# Patient Record
Sex: FEMALE | Race: WHITE | NOT HISPANIC OR LATINO | Employment: FULL TIME | URBAN - METROPOLITAN AREA
[De-identification: names, ages, dates, MRNs, and addresses within clinical notes are randomized per-mention and may not be internally consistent; named-entity substitution may affect disease eponyms.]

---

## 2019-12-16 ENCOUNTER — OFFICE VISIT (OUTPATIENT)
Dept: PODIATRY | Facility: CLINIC | Age: 59
End: 2019-12-16
Payer: COMMERCIAL

## 2019-12-16 VITALS — RESPIRATION RATE: 17 BRPM | BODY MASS INDEX: 34.55 KG/M2 | WEIGHT: 176 LBS | HEIGHT: 60 IN

## 2019-12-16 DIAGNOSIS — B35.3 TINEA PEDIS OF BOTH FEET: ICD-10-CM

## 2019-12-16 DIAGNOSIS — M89.8X9 BONY EXOSTOSIS: ICD-10-CM

## 2019-12-16 DIAGNOSIS — M20.41 HAMMER TOE OF RIGHT FOOT: Primary | ICD-10-CM

## 2019-12-16 DIAGNOSIS — M79.671 RIGHT FOOT PAIN: ICD-10-CM

## 2019-12-16 PROCEDURE — 99203 OFFICE O/P NEW LOW 30 MIN: CPT | Performed by: PODIATRIST

## 2019-12-16 PROCEDURE — 73620 X-RAY EXAM OF FOOT: CPT | Performed by: PODIATRIST

## 2019-12-16 RX ORDER — KETOCONAZOLE 20 MG/G
CREAM TOPICAL DAILY
Qty: 60 G | Refills: 1 | Status: SHIPPED | OUTPATIENT
Start: 2019-12-16 | End: 2021-03-25

## 2019-12-16 NOTE — PROGRESS NOTES
Assessment/Plan:  Pain upon ambulation  Soft corn 4th right interdigital space  Hammertoe formation 4th and 5th right toes  Exostosis formation  Plantar tinea pedis    Plan  X-rays taken  Lesion debrided  Patient advised on options  She presented to the office with the idea of surgical intervention  She has tried periodic debridements in the past in this fails to alleviate her complaint of pain  She therefore presented for surgical opinion  She is requesting surgery understanding all the possible risks benefits alternatives and complications understand no guarantees have been given  In addition, we will add topical antifungal     Diagnoses and all orders for this visit:    Hammer toe of right foot    Bony exostosis    Right foot pain          Subjective:  Patient has longstanding complaint of pain in toes of the right foot  She is aware she has a corn between the toes  She has been seeing another podiatrist for quite some time  She gets periodic debridement of the corn  This fails to alleviate her complaint of pain  She presents today for options  She is inquiring about surgery  History reviewed  No pertinent past medical history  History reviewed  No pertinent surgical history  No Known Allergies    No current outpatient medications on file  There is no problem list on file for this patient  Patient ID: Ronnie Talley is a 64 y o  female  HPI    The following portions of the patient's history were reviewed and updated as appropriate:     family history is not on file  reports that she has never smoked  She has never used smokeless tobacco  Her alcohol and drug histories are not on file  Vitals:    12/16/19 1209   Resp: 17       Review of Systems      Objective:  Patient's shoes and socks removed     Foot Exam    General  General Appearance: appears stated age and healthy   Orientation: alert and oriented to person, place, and time   Affect: appropriate   Gait: antalgic Right Foot/Ankle     Inspection and Palpation  Ecchymosis: none  Tenderness: bony tenderness   Swelling: dorsum and metatarsals   Arch: pes planus  Hammertoes: fourth toe and fifth toe  Hallux valgus: no  Hallux limitus: yes  Skin Exam: callus, dry skin and tinea; Neurovascular  Dorsalis pedis: 3+  Posterior tibial: 3+  Saphenous nerve sensation: normal  Tibial nerve sensation: normal  Superficial peroneal nerve sensation: normal  Deep peroneal nerve sensation: normal  Sural nerve sensation: normal  Achilles reflex: 2+  Babinski reflex: 2+      Left Foot/Ankle      Inspection and Palpation  Ecchymosis: none  Swelling: dorsum and metatarsals   Arch: pes planus  Hammertoes: fifth toe  Hallux valgus: no  Hallux limitus: yes  Skin Exam: callus, dry skin and tinea; Neurovascular  Dorsalis pedis: 3+  Posterior tibial: 3+  Saphenous nerve sensation: normal  Tibial nerve sensation: normal  Superficial peroneal nerve sensation: normal  Deep peroneal nerve sensation: normal  Sural nerve sensation: normal  Achilles reflex: 2+  Babinski reflex: 2+        Physical Exam   Constitutional: She is oriented to person, place, and time  She appears well-developed and well-nourished  Cardiovascular: Normal rate and regular rhythm  Pulses:       Dorsalis pedis pulses are 3+ on the right side, and 3+ on the left side  Posterior tibial pulses are 3+ on the right side, and 3+ on the left side  Musculoskeletal: She exhibits edema, tenderness and deformity  Right foot: There is bony tenderness  Hammertoe formation noted right foot  Adducto- varus 5th right toe noted  X-ray  Forefoot deviation noted  Feet:   Right Foot:   Skin Integrity: Positive for callus and dry skin  Left Foot:   Skin Integrity: Positive for callus and dry skin  Neurological: She is alert and oriented to person, place, and time     Reflex Scores:       Achilles reflexes are 2+ on the right side and 2+ on the left side   Skin: Skin is warm  Capillary refill takes less than 2 seconds  Soft corn in 4th right interdigital space   Psychiatric: She has a normal mood and affect  Her behavior is normal  Judgment and thought content normal    Vitals reviewed          Procedures

## 2019-12-17 PROBLEM — M20.41 ACQUIRED HAMMER TOE OF RIGHT FOOT: Status: ACTIVE | Noted: 2019-12-17

## 2019-12-17 PROBLEM — M89.8X9 EXOSTOSIS: Status: ACTIVE | Noted: 2019-12-17

## 2020-02-10 ENCOUNTER — APPOINTMENT (OUTPATIENT)
Dept: PREADMISSION TESTING | Facility: HOSPITAL | Age: 60
End: 2020-02-10
Payer: COMMERCIAL

## 2020-02-10 ENCOUNTER — APPOINTMENT (OUTPATIENT)
Dept: LAB | Facility: HOSPITAL | Age: 60
End: 2020-02-10
Attending: PODIATRIST
Payer: COMMERCIAL

## 2020-02-10 DIAGNOSIS — M89.8X9 EXOSTOSIS: ICD-10-CM

## 2020-02-10 DIAGNOSIS — M20.41 ACQUIRED HAMMER TOE OF RIGHT FOOT: ICD-10-CM

## 2020-02-10 LAB
ANION GAP SERPL CALCULATED.3IONS-SCNC: 9 MMOL/L (ref 4–13)
BASOPHILS # BLD AUTO: 0.02 THOUSANDS/ΜL (ref 0–0.1)
BASOPHILS NFR BLD AUTO: 0 % (ref 0–1)
BUN SERPL-MCNC: 18 MG/DL (ref 5–25)
CALCIUM SERPL-MCNC: 8.8 MG/DL (ref 8.3–10.1)
CHLORIDE SERPL-SCNC: 105 MMOL/L (ref 100–108)
CO2 SERPL-SCNC: 28 MMOL/L (ref 21–32)
CREAT SERPL-MCNC: 0.71 MG/DL (ref 0.6–1.3)
EOSINOPHIL # BLD AUTO: 0.02 THOUSAND/ΜL (ref 0–0.61)
EOSINOPHIL NFR BLD AUTO: 0 % (ref 0–6)
ERYTHROCYTE [DISTWIDTH] IN BLOOD BY AUTOMATED COUNT: 14.5 % (ref 11.6–15.1)
GFR SERPL CREATININE-BSD FRML MDRD: 94 ML/MIN/1.73SQ M
GLUCOSE P FAST SERPL-MCNC: 104 MG/DL (ref 65–99)
HCT VFR BLD AUTO: 37.5 % (ref 34.8–46.1)
HGB BLD-MCNC: 11.7 G/DL (ref 11.5–15.4)
IMM GRANULOCYTES # BLD AUTO: 0.01 THOUSAND/UL (ref 0–0.2)
IMM GRANULOCYTES NFR BLD AUTO: 0 % (ref 0–2)
LYMPHOCYTES # BLD AUTO: 2.51 THOUSANDS/ΜL (ref 0.6–4.47)
LYMPHOCYTES NFR BLD AUTO: 52 % (ref 14–44)
MCH RBC QN AUTO: 26.3 PG (ref 26.8–34.3)
MCHC RBC AUTO-ENTMCNC: 31.2 G/DL (ref 31.4–37.4)
MCV RBC AUTO: 84 FL (ref 82–98)
MONOCYTES # BLD AUTO: 0.36 THOUSAND/ΜL (ref 0.17–1.22)
MONOCYTES NFR BLD AUTO: 7 % (ref 4–12)
NEUTROPHILS # BLD AUTO: 1.98 THOUSANDS/ΜL (ref 1.85–7.62)
NEUTS SEG NFR BLD AUTO: 41 % (ref 43–75)
NRBC BLD AUTO-RTO: 0 /100 WBCS
PLATELET # BLD AUTO: 330 THOUSANDS/UL (ref 149–390)
PMV BLD AUTO: 9.7 FL (ref 8.9–12.7)
POTASSIUM SERPL-SCNC: 4.2 MMOL/L (ref 3.5–5.3)
RBC # BLD AUTO: 4.45 MILLION/UL (ref 3.81–5.12)
SODIUM SERPL-SCNC: 142 MMOL/L (ref 136–145)
WBC # BLD AUTO: 4.9 THOUSAND/UL (ref 4.31–10.16)

## 2020-02-10 PROCEDURE — 85025 COMPLETE CBC W/AUTO DIFF WBC: CPT

## 2020-02-10 PROCEDURE — 93005 ELECTROCARDIOGRAM TRACING: CPT

## 2020-02-10 PROCEDURE — 80048 BASIC METABOLIC PNL TOTAL CA: CPT

## 2020-02-10 PROCEDURE — 36415 COLL VENOUS BLD VENIPUNCTURE: CPT

## 2020-02-11 ENCOUNTER — OFFICE VISIT (OUTPATIENT)
Dept: PODIATRY | Facility: CLINIC | Age: 60
End: 2020-02-11
Payer: COMMERCIAL

## 2020-02-11 VITALS
DIASTOLIC BLOOD PRESSURE: 78 MMHG | RESPIRATION RATE: 16 BRPM | HEART RATE: 60 BPM | HEIGHT: 60 IN | SYSTOLIC BLOOD PRESSURE: 127 MMHG | BODY MASS INDEX: 34.55 KG/M2 | WEIGHT: 176 LBS

## 2020-02-11 DIAGNOSIS — M79.671 RIGHT FOOT PAIN: ICD-10-CM

## 2020-02-11 DIAGNOSIS — M20.41 HAMMER TOE OF RIGHT FOOT: Primary | ICD-10-CM

## 2020-02-11 DIAGNOSIS — B35.3 TINEA PEDIS OF BOTH FEET: ICD-10-CM

## 2020-02-11 DIAGNOSIS — M89.8X9 BONY EXOSTOSIS: ICD-10-CM

## 2020-02-11 PROCEDURE — 99213 OFFICE O/P EST LOW 20 MIN: CPT | Performed by: PODIATRIST

## 2020-02-11 NOTE — PROGRESS NOTES
Assessment/Plan:  Soft corn 4th right interdigital space  Hammertoe formation with secondary exostosis  Tinea pedis  Plan  Patient would like to cancel surgery  She will be traveling [place]  Today lesions debrided  Patient remain on topical antifungal   She still may need surgery in the future  There are no diagnoses linked to this encounter  Subjective:  Patient has pain in the toes of the right foot  She has not been using cream as directed  She would like to cancel surgical she is traveling  No Known Allergies      Current Outpatient Medications:     ibuprofen (MOTRIN) 200 mg tablet, Take by mouth every 6 (six) hours as needed for mild pain, Disp: , Rfl:     ketoconazole (NIZORAL) 2 % cream, Apply topically daily, Disp: 60 g, Rfl: 1    Multiple Vitamin (MULTIVITAMIN) tablet, Take 1 tablet by mouth daily, Disp: , Rfl:     Patient Active Problem List   Diagnosis    Acquired hammer toe of right foot    Exostosis          Patient ID: Addison Riojas is a 64 y o  female  HPI    The following portions of the patient's history were reviewed and updated as appropriate:     family history is not on file  reports that she has never smoked  She has never used smokeless tobacco  She reports that she does not drink alcohol or use drugs  Vitals:    02/11/20 0941   BP: 127/78   Pulse: 60   Resp: 16       Review of Systems      Objective:  Patient's shoes and socks removed     Foot ExamPhysical Exam       Foot Exam     General  General Appearance: appears stated age and healthy   Orientation: alert and oriented to person, place, and time   Affect: appropriate   Gait: antalgic         Right Foot/Ankle      Inspection and Palpation  Ecchymosis: none  Tenderness: bony tenderness   Swelling: dorsum and metatarsals   Arch: pes planus  Hammertoes: fourth toe and fifth toe  Hallux valgus: no  Hallux limitus: yes  Skin Exam: callus, dry skin and tinea;      Neurovascular  Dorsalis pedis: 3+  Posterior tibial: 3+  Saphenous nerve sensation: normal  Tibial nerve sensation: normal  Superficial peroneal nerve sensation: normal  Deep peroneal nerve sensation: normal  Sural nerve sensation: normal  Achilles reflex: 2+  Babinski reflex: 2+        Left Foot/Ankle       Inspection and Palpation  Ecchymosis: none  Swelling: dorsum and metatarsals   Arch: pes planus  Hammertoes: fifth toe  Hallux valgus: no  Hallux limitus: yes  Skin Exam: callus, dry skin and tinea;      Neurovascular  Dorsalis pedis: 3+  Posterior tibial: 3+  Saphenous nerve sensation: normal  Tibial nerve sensation: normal  Superficial peroneal nerve sensation: normal  Deep peroneal nerve sensation: normal  Sural nerve sensation: normal  Achilles reflex: 2+  Babinski reflex: 2+           Physical Exam   Constitutional: She is oriented to person, place, and time  She appears well-developed and well-nourished  Cardiovascular: Normal rate and regular rhythm  Pulses:       Dorsalis pedis pulses are 3+ on the right side, and 3+ on the left side  Posterior tibial pulses are 3+ on the right side, and 3+ on the left side  Musculoskeletal: She exhibits edema, tenderness and deformity  Right foot: There is bony tenderness  Hammertoe formation noted right foot  Adducto- varus 5th right toe noted  X-ray  Forefoot deviation noted  Feet:   Right Foot:   Skin Integrity: Positive for callus and dry skin  Left Foot:   Skin Integrity: Positive for callus and dry skin  Neurological: She is alert and oriented to person, place, and time  Reflex Scores:       Achilles reflexes are 2+ on the right side and 2+ on the left side  Skin: Skin is warm  Capillary refill takes less than 2 seconds  Soft corn in 4th right interdigital space   Psychiatric: She has a normal mood and affect   Her behavior is normal  Judgment and thought content normal

## 2020-02-12 LAB
ATRIAL RATE: 58 BPM
P AXIS: 71 DEGREES
PR INTERVAL: 162 MS
QRS AXIS: 7 DEGREES
QRSD INTERVAL: 84 MS
QT INTERVAL: 444 MS
QTC INTERVAL: 435 MS
T WAVE AXIS: 52 DEGREES
VENTRICULAR RATE: 58 BPM

## 2020-02-12 PROCEDURE — 93010 ELECTROCARDIOGRAM REPORT: CPT | Performed by: INTERNAL MEDICINE

## 2021-03-25 ENCOUNTER — APPOINTMENT (EMERGENCY)
Dept: RADIOLOGY | Facility: HOSPITAL | Age: 61
End: 2021-03-25
Payer: COMMERCIAL

## 2021-03-25 ENCOUNTER — HOSPITAL ENCOUNTER (EMERGENCY)
Facility: HOSPITAL | Age: 61
Discharge: HOME | End: 2021-03-25
Attending: EMERGENCY MEDICINE
Payer: COMMERCIAL

## 2021-03-25 VITALS
TEMPERATURE: 97.2 F | OXYGEN SATURATION: 100 % | DIASTOLIC BLOOD PRESSURE: 81 MMHG | HEART RATE: 70 BPM | RESPIRATION RATE: 18 BRPM | SYSTOLIC BLOOD PRESSURE: 135 MMHG

## 2021-03-25 DIAGNOSIS — S80.01XA CONTUSION OF RIGHT KNEE, INITIAL ENCOUNTER: ICD-10-CM

## 2021-03-25 DIAGNOSIS — S09.90XA INJURY OF HEAD, INITIAL ENCOUNTER: Primary | ICD-10-CM

## 2021-03-25 DIAGNOSIS — S06.0X9A CONCUSSION: ICD-10-CM

## 2021-03-25 PROCEDURE — 73564 X-RAY EXAM KNEE 4 OR MORE: CPT

## 2021-03-25 PROCEDURE — 70450 CT HEAD/BRAIN W/O DYE: CPT

## 2021-03-25 PROCEDURE — 99284 EMERGENCY DEPT VISIT MOD MDM: CPT

## 2021-03-25 PROCEDURE — 99284 EMERGENCY DEPT VISIT MOD MDM: CPT | Performed by: EMERGENCY MEDICINE

## 2021-03-25 NOTE — ED PROVIDER NOTES
History  Chief Complaint   Patient presents with    Headache     Pt tripped over dog on SUNDAY, worked sicteen hour shift last night, now c/o headache, dizziness, neck pain and right knee pain  Patient presents for evaluation of a head injury  Patient states on Sunday she was walking her dogs when 1 of them pulled her and she fell forward  Patient sustained injury to her forehead and has swelling  Not have much pain at that time but later on developed a headache which she been treating with over-the-counter medications  She also had a sore right knee as well and right-sided neck pain  States last night she had a workup longer shift as 3rd relief called out sick  Headache fell worse or lightheadedness  Denies any nausea vomiting  No blood thinners and no loss of consciousness  Patient had not been evaluated previously for injuries  History provided by:  Patient   used: No    Headache  Associated symptoms: dizziness and neck pain    Associated symptoms: no abdominal pain, no back pain, no congestion, no cough, no fever, no nausea, no numbness, no photophobia, no vomiting and no weakness        Prior to Admission Medications   Prescriptions Last Dose Informant Patient Reported? Taking?   ibuprofen (MOTRIN) 200 mg tablet   Yes Yes   Sig: Take by mouth every 6 (six) hours as needed for mild pain      Facility-Administered Medications: None       Past Medical History:   Diagnosis Date    Colon polyp     Obesity        Past Surgical History:   Procedure Laterality Date    COLONOSCOPY      HYSTERECTOMY         History reviewed  No pertinent family history  I have reviewed and agree with the history as documented      E-Cigarette/Vaping     E-Cigarette/Vaping Substances     Social History     Tobacco Use    Smoking status: Never Smoker    Smokeless tobacco: Never Used   Substance Use Topics    Alcohol use: Never     Frequency: Never    Drug use: Never       Review of Systems Constitutional: Negative for chills and fever  HENT: Negative for congestion and trouble swallowing  Eyes: Negative for photophobia and visual disturbance  Respiratory: Negative for cough and shortness of breath  Cardiovascular: Negative for chest pain  Gastrointestinal: Negative for abdominal pain, nausea and vomiting  Genitourinary: Negative for difficulty urinating and dysuria  Musculoskeletal: Positive for arthralgias and neck pain  Negative for back pain  Skin: Negative for wound  Neurological: Positive for dizziness, light-headedness and headaches  Negative for weakness and numbness  All other systems reviewed and are negative  Physical Exam  Physical Exam  Vitals signs and nursing note reviewed  Constitutional:       General: She is not in acute distress  Appearance: Normal appearance  HENT:      Head:        Right Ear: External ear normal       Left Ear: External ear normal       Nose: Nose normal       Mouth/Throat:      Mouth: Mucous membranes are moist       Pharynx: Oropharynx is clear  Eyes:      General: No scleral icterus  Extraocular Movements: Extraocular movements intact  Conjunctiva/sclera: Conjunctivae normal       Pupils: Pupils are equal, round, and reactive to light  Neck:      Musculoskeletal: Normal range of motion and neck supple  Muscular tenderness present  Comments: Right lateral neck tenderness  No midline tenderness  Cardiovascular:      Rate and Rhythm: Normal rate and regular rhythm  Pulses: Normal pulses  Pulmonary:      Effort: Pulmonary effort is normal  No respiratory distress  Breath sounds: Normal breath sounds  No wheezing, rhonchi or rales  Abdominal:      General: Abdomen is flat  Bowel sounds are normal  There is no distension  Tenderness: There is no abdominal tenderness  There is no guarding or rebound  Musculoskeletal: Normal range of motion  General: Tenderness present  No deformity  Legs:    Skin:     Capillary Refill: Capillary refill takes less than 2 seconds  Findings: No rash  Neurological:      General: No focal deficit present  Mental Status: She is alert and oriented to person, place, and time  Cranial Nerves: No cranial nerve deficit  Sensory: No sensory deficit  Motor: No weakness  Coordination: Coordination normal       Gait: Gait normal          Vital Signs  ED Triage Vitals [03/25/21 0828]   Temperature Pulse Respirations Blood Pressure SpO2   (!) 97 2 °F (36 2 °C) 82 16 (!) 174/89 100 %      Temp Source Heart Rate Source Patient Position - Orthostatic VS BP Location FiO2 (%)   Tympanic Monitor Sitting Right arm --      Pain Score       6           Vitals:    03/25/21 0828 03/25/21 0952   BP: (!) 174/89 135/81   Pulse: 82 70   Patient Position - Orthostatic VS: Sitting Lying         Visual Acuity      ED Medications  Medications - No data to display    Diagnostic Studies  Results Reviewed     None                 XR knee 4+ views Right injury   Final Result by Florence Lang DO (03/25 1034)      No acute osseous abnormality  Degenerative changes as described  Workstation performed: [id number]         CT head without contrast   Final Result by José Manuel Cornell MD (03/25 3310)         1  No acute intracranial abnormality noted  2   2 3 x 1 9 x 2 1 cm osseous or dural based excrescence  in the left frontal region consistent with either calcified meningioma or osteochondroma/exostosis  Workstation performed: [id number]                    Procedures  Procedures         ED Course                                           MDM  Number of Diagnoses or Management Options  Concussion:   Contusion of right knee, initial encounter:   Injury of head, initial encounter:   Diagnosis management comments: Pulse ox 100% room air indicating adequate oxygenation    Xray R knee:  No fracture dislocation as read by me Patient likely experiencing post concussive symptoms that were made worse by her extended shift last night  Will obtain CT scan of the head of rule out any other acute pathology since symptoms have been worsening there is no initial evaluation  Results of CT scan including incidental finding of possible meningioma in the frontal lobe discussed with patient as well as outpatient follow-up  Patient likely experiencing symptoms of a concussion after head injury on Sunday  Amount and/or Complexity of Data Reviewed  Tests in the radiology section of CPT®: ordered and reviewed  Decide to obtain previous medical records or to obtain history from someone other than the patient: yes  Review and summarize past medical records: yes  Independent visualization of images, tracings, or specimens: yes    Patient Progress  Patient progress: stable      Disposition  Final diagnoses:   Injury of head, initial encounter   Concussion   Contusion of right knee, initial encounter     Time reflects when diagnosis was documented in both MDM as applicable and the Disposition within this note     Time User Action Codes Description Comment    3/25/2021  9:47 AM Naseem Palomares E Add [R51 9] Headache     3/25/2021  9:47 AM Paradise Clarke Remove [R51 9] Headache     3/25/2021  9:48 AM Paradise Clarke Add [S09 90XA] Injury of head, initial encounter     3/25/2021  9:48 AM Paradise Clarke Add [S06 0X9A] Concussion     3/25/2021  9:48 AM Nela Lyle Mock Add [S80 01XA] Contusion of right knee, initial encounter       ED Disposition     ED Disposition Condition Date/Time Comment    Discharge Stable Thu Mar 25, 2021  9:47 AM Earline Huerta discharge to home/self care              Follow-up Information     Follow up With Specialties Details Why Contact Info    [facility]  In 1 week  [phone]            Discharge Medication List as of 3/25/2021  9:48 AM      CONTINUE these medications which have NOT CHANGED    Details   ibuprofen (MOTRIN) 200 mg tablet Take by mouth every 6 (six) hours as needed for mild pain, Historical Med           No discharge procedures on file      PDMP Review     None          ED Provider  Electronically Signed by           Argentina Mckenzie DO  03/25/21 5639

## 2021-04-15 ENCOUNTER — OFFICE VISIT (OUTPATIENT)
Dept: PODIATRY | Facility: CLINIC | Age: 61
End: 2021-04-15
Payer: COMMERCIAL

## 2021-04-15 VITALS
DIASTOLIC BLOOD PRESSURE: 81 MMHG | HEIGHT: 60 IN | SYSTOLIC BLOOD PRESSURE: 135 MMHG | HEART RATE: 70 BPM | WEIGHT: 175 LBS | RESPIRATION RATE: 16 BRPM | BODY MASS INDEX: 34.36 KG/M2

## 2021-04-15 DIAGNOSIS — M79.671 RIGHT FOOT PAIN: ICD-10-CM

## 2021-04-15 DIAGNOSIS — B35.3 TINEA PEDIS OF BOTH FEET: ICD-10-CM

## 2021-04-15 DIAGNOSIS — M20.41 HAMMER TOE OF RIGHT FOOT: Primary | ICD-10-CM

## 2021-04-15 DIAGNOSIS — B35.1 ONYCHOMYCOSIS: ICD-10-CM

## 2021-04-15 PROCEDURE — 99213 OFFICE O/P EST LOW 20 MIN: CPT | Performed by: PODIATRIST

## 2021-04-15 RX ORDER — TERBINAFINE HYDROCHLORIDE 250 MG/1
250 TABLET ORAL DAILY
Qty: 30 TABLET | Refills: 0 | Status: SHIPPED | OUTPATIENT
Start: 2021-04-15 | End: 2021-05-15

## 2021-04-15 NOTE — PROGRESS NOTES
Assessment/Plan: pain upon ambulation  Hammertoe formation with secondary soft corn 4th space right foot  Mycosis of nail and skin  Plan  All abnormal skin debrided  Topical and oral antifungals ordered  Patient would like to reschedule hammertoe surgery  We will do this accordingly  Diagnoses and all orders for this visit:    Hammer toe of right foot    Right foot pain    Tinea pedis of both feet  -     ciclopirox (LOPROX) 0 77 % cream; Apply topically 2 (two) times a day for 20 days  -     terbinafine (LamISIL) 250 mg tablet; Take 1 tablet (250 mg total) by mouth daily    Onychomycosis  -     ciclopirox (LOPROX) 0 77 % cream; Apply topically 2 (two) times a day for 20 days  -     terbinafine (LamISIL) 250 mg tablet; Take 1 tablet (250 mg total) by mouth daily          Subjective:   Patient has continued ongoing pain in the feet  She has no history of trauma  She has painful corns  She would like to have surgery to fix the problem  No Known Allergies      Current Outpatient Medications:     ciclopirox (LOPROX) 0 77 % cream, Apply topically 2 (two) times a day for 20 days, Disp: 45 g, Rfl: 2    ibuprofen (MOTRIN) 200 mg tablet, Take by mouth every 6 (six) hours as needed for mild pain, Disp: , Rfl:     terbinafine (LamISIL) 250 mg tablet, Take 1 tablet (250 mg total) by mouth daily, Disp: 30 tablet, Rfl: 0    Patient Active Problem List   Diagnosis    Acquired hammer toe of right foot    Exostosis          Patient ID: John Chirinos is a 63 y o  female  HPI    The following portions of the patient's history were reviewed and updated as appropriate:     family history is not on file  reports that she has never smoked  She has never used smokeless tobacco  She reports that she does not drink alcohol or use drugs  Vitals:    04/15/21 1038   BP: 135/81   Pulse: 70   Resp: 16       Review of Systems      Objective:  Patient's shoes and socks removed     Foot ExamPhysical Exam  Foot Exam     General  General Appearance: appears stated age and healthy   Orientation: alert and oriented to person, place, and time   Affect: appropriate   Gait: antalgic         Right Foot/Ankle      Inspection and Palpation  Ecchymosis: none  Tenderness: bony tenderness   Swelling: dorsum and metatarsals   Arch: pes planus  Hammertoes: fourth toe and fifth toe  Hallux valgus: no  Hallux limitus: yes  Skin Exam: callus, dry skin and tinea;      Neurovascular  Dorsalis pedis: 3+  Posterior tibial: 3+  Saphenous nerve sensation: normal  Tibial nerve sensation: normal  Superficial peroneal nerve sensation: normal  Deep peroneal nerve sensation: normal  Sural nerve sensation: normal  Achilles reflex: 2+  Babinski reflex: 2+        Left Foot/Ankle       Inspection and Palpation  Ecchymosis: none  Swelling: dorsum and metatarsals   Arch: pes planus  Hammertoes: fifth toe  Hallux valgus: no  Hallux limitus: yes  Skin Exam: callus, dry skin and tinea;      Neurovascular  Dorsalis pedis: 3+  Posterior tibial: 3+  Saphenous nerve sensation: normal  Tibial nerve sensation: normal  Superficial peroneal nerve sensation: normal  Deep peroneal nerve sensation: normal  Sural nerve sensation: normal  Achilles reflex: 2+  Babinski reflex: 2+           Physical Exam   Constitutional: She is oriented to person, place, and time  She appears well-developed and well-nourished  Cardiovascular: Normal rate and regular rhythm  Pulses:       Dorsalis pedis pulses are 3+ on the right side, and 3+ on the left side         Posterior tibial pulses are 3+ on the right side, and 3+ on the left side  Musculoskeletal: She exhibits edema, tenderness and deformity       Right foot: There is bony tenderness    Hammertoe formation noted right foot   Adducto- varus 5th right toe noted  X-ray   Forefoot deviation noted          Feet:   Right Foot:   Skin Integrity: Positive for callus and dry skin     Left Foot:   Skin Integrity: Positive for callus and dry skin  Neurological: She is alert and oriented to person, place, and time  Reflex Scores:       Achilles reflexes are 2+ on the right side and 2+ on the left side  Skin: Skin is warm  Capillary refill takes less than 2 seconds    Soft corn in 4th right interdigital space   Psychiatric: She has a normal mood and affect   Her behavior is normal  Judgment and thought content normal

## 2021-06-16 RX ORDER — MELATONIN
1000 DAILY
COMMUNITY

## 2021-06-16 RX ORDER — ECHINACEA 400 MG
CAPSULE ORAL
COMMUNITY

## 2021-06-16 RX ORDER — MULTIVITAMIN
1 TABLET ORAL DAILY
COMMUNITY

## 2021-06-16 RX ORDER — CHLORAL HYDRATE 500 MG
1000 CAPSULE ORAL DAILY
COMMUNITY

## 2021-06-16 NOTE — PRE-PROCEDURE INSTRUCTIONS
My Surgical Experience    The following information was developed to assist you to prepare for your operation  What do I need to do before coming to the hospital?   Arrange for a responsible person to drive you to and from the hospital    Arrange care for your children at home  Children are not allowed in the recovery areas of the hospital   Plan to wear clothing that is easy to put on and take off  If you are having shoulder surgery, wear a shirt that buttons or zippers in the front  Bathing  o Shower the evening before and the morning of your surgery with an antibacterial soap  Please refer to the Pre Op Showering Instructions for Surgery Patients Sheet   o Remove nail polish and all body piercing jewelry  o Do not shave any body part for at least 24 hours before surgery-this includes face, arms, legs and upper body  Food  o Nothing to eat or drink after midnight the night before your surgery  This includes candy and chewing gum  o Exception: If your surgery is after 12:00pm (noon), you may have clear liquids such as 7-Up®, ginger ale, apple or cranberry juice, Jell-O®, water, or clear broth until 8:00 am  o Do not drink milk or juice with pulp on the morning before surgery  o Do not drink alcohol 24 hours before surgery  Medicine  o Follow instructions you received from your surgeon about which medicines you may take on the day of surgery  o If instructed to take medicine on the morning of surgery, take pills with just a small sip of water  Call your prescribing doctor for specific infroamtion on what to do if you take insulin    What should I bring to the hospital?    Bring:  Kana Nelson or a walker, if you have them, for foot or knee surgery   A list of the daily medicines, vitamins, minerals, herbals and nutritional supplements you take   Include the dosages of medicines and the time you take them each day   Glasses, dentures or hearing aids   Minimal clothing; you will be wearing hospital sleepwear   Photo ID; required to verify your identity   If you have a Living Will or Power of , bring a copy of the documents   If you have an ostomy, bring an extra pouch and any supplies you use    Do not bring   Medicines or inhalers   Money, valuables or jewelry    What other information should I know about the day of surgery?  Notify your surgeons if you develop a cold, sore throat, cough, fever, rash or any other illness   Report to the Ambulatory Surgical/Same Day Surgery Unit   You will be instructed to stop at Registration only if you have not been pre-registered   Inform your  fi they do not stay that they will be asked by the staff to leave a phone number where they can be reached   Be available to be reached before surgery  In the event the operating room schedule changes, you may be asked to come in earlier or later than expected    *It is important to tell your doctor and others involved in your health care if you are taking or have been taking any non-prescription drugs, vitamins, minerals, herbals or other nutritional supplements  Any of these may interact with some food or medicines and cause a reaction      Pre-Surgery Instructions:   Medication Instructions    cholecalciferol (VITAMIN D3) 1,000 units tablet Instructed patient per Anesthesia Guidelines   Flaxseed, Linseed, (Flaxseed Oil) 1000 MG CAPS Instructed patient per Anesthesia Guidelines   ibuprofen (MOTRIN) 200 mg tablet Instructed patient per Anesthesia Guidelines   Multiple Vitamin (multivitamin) tablet Instructed patient per Anesthesia Guidelines   Omega-3 Fatty Acids (fish oil) 1,000 mg Instructed patient per Anesthesia Guidelines

## 2021-06-21 ENCOUNTER — CLINICAL SUPPORT (OUTPATIENT)
Dept: URGENT CARE | Facility: CLINIC | Age: 61
End: 2021-06-21

## 2021-06-21 DIAGNOSIS — M20.41 ACQUIRED HAMMER TOE OF RIGHT FOOT: Primary | ICD-10-CM

## 2021-06-21 PROCEDURE — U0003 INFECTIOUS AGENT DETECTION BY NUCLEIC ACID (DNA OR RNA); SEVERE ACUTE RESPIRATORY SYNDROME CORONAVIRUS 2 (SARS-COV-2) (CORONAVIRUS DISEASE [COVID-19]), AMPLIFIED PROBE TECHNIQUE, MAKING USE OF HIGH THROUGHPUT TECHNOLOGIES AS DESCRIBED BY CMS-2020-01-R: HCPCS | Performed by: PODIATRIST

## 2021-06-21 PROCEDURE — U0005 INFEC AGEN DETEC AMPLI PROBE: HCPCS | Performed by: PODIATRIST

## 2021-06-24 ENCOUNTER — ANESTHESIA EVENT (OUTPATIENT)
Dept: PERIOP | Facility: HOSPITAL | Age: 61
End: 2021-06-24
Payer: COMMERCIAL

## 2021-06-25 ENCOUNTER — ANESTHESIA (OUTPATIENT)
Dept: PERIOP | Facility: HOSPITAL | Age: 61
End: 2021-06-25
Payer: COMMERCIAL

## 2021-06-25 ENCOUNTER — HOSPITAL ENCOUNTER (OUTPATIENT)
Facility: HOSPITAL | Age: 61
Setting detail: OUTPATIENT SURGERY
Discharge: HOME | End: 2021-06-25
Attending: PODIATRIST | Admitting: PODIATRIST
Payer: COMMERCIAL

## 2021-06-25 VITALS
HEIGHT: 60 IN | BODY MASS INDEX: 36.32 KG/M2 | TEMPERATURE: 97 F | WEIGHT: 185 LBS | SYSTOLIC BLOOD PRESSURE: 129 MMHG | OXYGEN SATURATION: 100 % | DIASTOLIC BLOOD PRESSURE: 72 MMHG | RESPIRATION RATE: 16 BRPM | HEART RATE: 77 BPM

## 2021-06-25 DIAGNOSIS — M20.41 HAMMER TOE OF RIGHT FOOT: Primary | ICD-10-CM

## 2021-06-25 DIAGNOSIS — M20.41 ACQUIRED HAMMER TOE OF RIGHT FOOT: ICD-10-CM

## 2021-06-25 DIAGNOSIS — M79.671 RIGHT FOOT PAIN: ICD-10-CM

## 2021-06-25 DIAGNOSIS — M89.8X9 EXOSTOSIS: Primary | ICD-10-CM

## 2021-06-25 PROCEDURE — 28285 REPAIR OF HAMMERTOE: CPT | Performed by: PODIATRIST

## 2021-06-25 PROCEDURE — 28124 PARTIAL REMOVAL OF TOE: CPT | Performed by: PODIATRIST

## 2021-06-25 RX ORDER — ACETAMINOPHEN 325 MG/1
650 TABLET ORAL EVERY 6 HOURS PRN
COMMUNITY

## 2021-06-25 RX ORDER — EPHEDRINE SULFATE 50 MG/ML
INJECTION INTRAVENOUS AS NEEDED
Status: DISCONTINUED | OUTPATIENT
Start: 2021-06-25 | End: 2021-06-25

## 2021-06-25 RX ORDER — MIDAZOLAM HYDROCHLORIDE 2 MG/2ML
INJECTION, SOLUTION INTRAMUSCULAR; INTRAVENOUS AS NEEDED
Status: DISCONTINUED | OUTPATIENT
Start: 2021-06-25 | End: 2021-06-25

## 2021-06-25 RX ORDER — OXYCODONE HYDROCHLORIDE AND ACETAMINOPHEN 5; 325 MG/1; MG/1
1 TABLET ORAL EVERY 6 HOURS PRN
Qty: 20 TABLET | Refills: 0 | Status: SHIPPED | OUTPATIENT
Start: 2021-06-25 | End: 2021-06-30

## 2021-06-25 RX ORDER — MAGNESIUM HYDROXIDE 1200 MG/15ML
LIQUID ORAL AS NEEDED
Status: DISCONTINUED | OUTPATIENT
Start: 2021-06-25 | End: 2021-06-25 | Stop reason: HOSPADM

## 2021-06-25 RX ORDER — PROPOFOL 10 MG/ML
INJECTION, EMULSION INTRAVENOUS AS NEEDED
Status: DISCONTINUED | OUTPATIENT
Start: 2021-06-25 | End: 2021-06-25

## 2021-06-25 RX ORDER — ONDANSETRON 2 MG/ML
INJECTION INTRAMUSCULAR; INTRAVENOUS AS NEEDED
Status: DISCONTINUED | OUTPATIENT
Start: 2021-06-25 | End: 2021-06-25

## 2021-06-25 RX ORDER — MELOXICAM 7.5 MG/1
7.5 TABLET ORAL DAILY
Qty: 10 TABLET | Refills: 0 | Status: SHIPPED | OUTPATIENT
Start: 2021-06-25 | End: 2021-07-05

## 2021-06-25 RX ORDER — HYDROXYZINE HYDROCHLORIDE 25 MG/1
25 TABLET, FILM COATED ORAL 3 TIMES DAILY
Qty: 9 TABLET | Refills: 0 | Status: SHIPPED | OUTPATIENT
Start: 2021-06-25 | End: 2021-06-28

## 2021-06-25 RX ORDER — SODIUM CHLORIDE, SODIUM LACTATE, POTASSIUM CHLORIDE, CALCIUM CHLORIDE 600; 310; 30; 20 MG/100ML; MG/100ML; MG/100ML; MG/100ML
INJECTION, SOLUTION INTRAVENOUS CONTINUOUS PRN
Status: DISCONTINUED | OUTPATIENT
Start: 2021-06-25 | End: 2021-06-25

## 2021-06-25 RX ORDER — CEFAZOLIN SODIUM 2 G/50ML
2000 SOLUTION INTRAVENOUS ONCE
Status: DISCONTINUED | OUTPATIENT
Start: 2021-06-25 | End: 2021-06-25 | Stop reason: HOSPADM

## 2021-06-25 RX ORDER — FENTANYL CITRATE 50 UG/ML
INJECTION, SOLUTION INTRAMUSCULAR; INTRAVENOUS AS NEEDED
Status: DISCONTINUED | OUTPATIENT
Start: 2021-06-25 | End: 2021-06-25

## 2021-06-25 RX ORDER — DEXAMETHASONE SODIUM PHOSPHATE 4 MG/ML
INJECTION, SOLUTION INTRA-ARTICULAR; INTRALESIONAL; INTRAMUSCULAR; INTRAVENOUS; SOFT TISSUE AS NEEDED
Status: DISCONTINUED | OUTPATIENT
Start: 2021-06-25 | End: 2021-06-25

## 2021-06-25 RX ORDER — PROPOFOL 10 MG/ML
INJECTION, EMULSION INTRAVENOUS CONTINUOUS PRN
Status: DISCONTINUED | OUTPATIENT
Start: 2021-06-25 | End: 2021-06-25

## 2021-06-25 RX ORDER — SODIUM CHLORIDE, SODIUM LACTATE, POTASSIUM CHLORIDE, CALCIUM CHLORIDE 600; 310; 30; 20 MG/100ML; MG/100ML; MG/100ML; MG/100ML
125 INJECTION, SOLUTION INTRAVENOUS CONTINUOUS
Status: DISCONTINUED | OUTPATIENT
Start: 2021-06-25 | End: 2021-06-25 | Stop reason: HOSPADM

## 2021-06-25 RX ORDER — CEFAZOLIN SODIUM 2 G/50ML
SOLUTION INTRAVENOUS AS NEEDED
Status: DISCONTINUED | OUTPATIENT
Start: 2021-06-25 | End: 2021-06-25

## 2021-06-25 RX ORDER — CLINDAMYCIN HYDROCHLORIDE 300 MG/1
300 CAPSULE ORAL 4 TIMES DAILY
Qty: 40 CAPSULE | Refills: 0 | Status: SHIPPED | OUTPATIENT
Start: 2021-06-25 | End: 2021-07-05

## 2021-06-25 RX ADMIN — PROPOFOL 120 MCG/KG/MIN: 10 INJECTION, EMULSION INTRAVENOUS at 10:14

## 2021-06-25 RX ADMIN — FENTANYL CITRATE 50 MCG: 50 INJECTION, SOLUTION INTRAMUSCULAR; INTRAVENOUS at 10:12

## 2021-06-25 RX ADMIN — SODIUM CHLORIDE, SODIUM LACTATE, POTASSIUM CHLORIDE, AND CALCIUM CHLORIDE: .6; .31; .03; .02 INJECTION, SOLUTION INTRAVENOUS at 10:12

## 2021-06-25 RX ADMIN — ONDANSETRON 4 MG: 2 INJECTION INTRAMUSCULAR; INTRAVENOUS at 10:25

## 2021-06-25 RX ADMIN — DEXAMETHASONE SODIUM PHOSPHATE 4 MG: 4 INJECTION, SOLUTION INTRA-ARTICULAR; INTRALESIONAL; INTRAMUSCULAR; INTRAVENOUS; SOFT TISSUE at 10:25

## 2021-06-25 RX ADMIN — LIDOCAINE HYDROCHLORIDE 40 MG: 20 INJECTION, SOLUTION INTRAVENOUS at 10:14

## 2021-06-25 RX ADMIN — PROPOFOL 50 MG: 10 INJECTION, EMULSION INTRAVENOUS at 10:14

## 2021-06-25 RX ADMIN — FENTANYL CITRATE 50 MCG: 50 INJECTION, SOLUTION INTRAMUSCULAR; INTRAVENOUS at 10:25

## 2021-06-25 RX ADMIN — MIDAZOLAM 2 MG: 1 INJECTION INTRAMUSCULAR; INTRAVENOUS at 10:12

## 2021-06-25 RX ADMIN — CEFAZOLIN SODIUM 2000 MG: 2 SOLUTION INTRAVENOUS at 10:16

## 2021-06-25 RX ADMIN — EPHEDRINE SULFATE 5 MG: 50 INJECTION, SOLUTION INTRAVENOUS at 10:23

## 2021-06-25 NOTE — DISCHARGE INSTRUCTIONS
PODIATRY DISCHARGE INSTRUCTIONS  DR Johnna Das Way Instructions    · No driving or operating machinery for 24 hours or while taking pain medication  · No alcoholic beverages for 24 hours or while taking pain medication  · DO NOT make any important personal or business decisions for 24 hours  Diet:  · Begin with liquids and light food and progress to your normal diet unless you are nauseated or otherwise instructed by your physician  Medication:  · Begin taking pain medication as directed and remember that narcotic medications may be constipating  Consider starting over the counter stool softeners  If constipation persists begin taking over the counter laxative  Dressing:  · Keep dressing dry   Do not remove dressing until seen by Dr Fraser Killer:  · Apply ice to affected area 20 minutes on and 20 minutes off unless otherwise         Instructed     Elevation:  · Keep affected foot elevated on pillows    Weight Bearing status:  ____ okay with crutches _______________________  Wear blue surgical shoe when walking  Use crutches, cane or walker as directed    Keep regular scheduled appointment with Dr Burgess Hernandez    Call Dr Burgess Hernandez with any problems or questions at [phone]

## 2021-06-25 NOTE — ANESTHESIA POSTPROCEDURE EVALUATION
Post-Op Assessment Note    CV Status:  Stable  Pain Score: 0    Pain management: adequate     Mental Status:  Sleepy   Hydration Status:  Stable and euvolemic   PONV Controlled:  None   Airway Patency:  Patent       Staff: CRNA         No complications documented      BP   81/51   Temp 36 0C   Pulse 85   Resp 20   SpO2 99%6lfm

## 2021-06-25 NOTE — PROGRESS NOTES
Patient presents for surgery today  She has a painful corn between the 4th 5th toes of the right foot  She has hammertoe formation 4th and 5th right toes  She is requesting surgery in the hope of alleviating her complaint of pain  She is consenting to surgery understanding all the possible risks benefits alternatives and complications understanding no guarantees have been given  Patient has been given postoperative instruction  Postoperative medication ordered

## 2021-06-25 NOTE — INTERIM OP NOTE
HAMMERTOE SURGERY 5TH TOE, OSTEOTOMY 4TH TOE 4&5 right foot  Postoperative Note  PATIENT NAME: Shira Price  : 1960  MRN: [id number]  [facility] ROOM 04    Surgery Date: 2021    Preop Diagnosis:  Hammer toe of right foot [M20 41]    Post-Op Diagnosis Codes:     * Hammer toe of right foot [M20 41]    Procedure(s) (LRB):  HAMMERTOE SURGERY 5TH TOE, OSTEOTOMY 4TH TOE 4&5 right foot (Right)    Surgeon(s) and Role:     Usha Ardon DPM - Primary    Specimens:  * No specimens in log *    Estimated Blood Loss:   Minimal    Anesthesia Type:   IV Sedation with Anesthesia     Findings:    Acquired deformity right foot  Hammertoe 4 and 5 right with secondary soft corn    Complications:   None    SIGNATURE: Twila Seymour DPM   DATE: 2021   TIME: 10:44 AM

## 2021-06-25 NOTE — OP NOTE
OPERATIVE REPORT  PATIENT NAME: Bre Mckee    :  1960  MRN: [id number]  Pt Location: [facility] OR ROOM 04    SURGERY DATE: 2021    Surgeon(s) and Role:     Linda Mai DPM - Primary    Preop Diagnosis:  Hammer toe of right foot [M20 41]    Post-Op Diagnosis Codes:     * Hammer toe of right foot [M20 41]    Procedure(s) (LRB):  HAMMERTOE SURGERY 5TH TOE, OSTEOTOMY 4TH TOE 4&5 right foot (Right)    Specimen(s):  * No specimens in log *    Estimated Blood Loss:   Minimal    Drains:  * No LDAs found *    Anesthesia Type:   IV Sedation with Anesthesia    Operative Indications:  Hammer toe of right foot [M20 41]    Patient has pain in her right foot  She has a soft corn of the 4th interdigital space caused by in large knuckle the 4th and 5th toe respectively  The toes are hammered  Patient failed to have alleviation of pain with shoe gear changes well as corn debridement so she therefore opted for surgical intervention in the hope of alleviating her complaint of pain  Patient has consented to surgery understanding all the possible risks benefits alternatives and complications understanding no guarantees have been given  Patient understands and following could occur but not limited to pain scar swelling hypo or hyper seizure  RSD phlebitis or vasculitis  Need for future surgery  Continuation of deformity as well as continuation of corn  Need for future surgery  Wound need for wound in care infection need for infection care to name a few  Operative Findings:    Patient demonstrates adductovarus hammertoe of the 5th toe as well as a deviated 5th right toe  Secondary soft corn formation 4th interdigital space  Complications:   None    Procedure and Technique:    Patient was brought to the OR placed on the operating table in a supine position  After prepping and draping in the usual sterile manner attention was then directed to the right foot    A 15 blade scalp was used to perform 2 semi elliptical converging incisions overlying the dorsal lateral aspect of the 5th right toe  The incisions were placed from distal medial proximal lateral   This created wedge of skin was used to close down the toe upon closure  A wedge of skin removed  Dissection was carried deep with all bleeders being bovied and coagulated as necessary  The extensor tendon was transected at this time giving exposure to the PIPJ  Soft tissue attachments around the head of the proximal phalanx were freed  Oscillating saw was used to resect the head of the proximal phalanx  This was resected from the wound  At this point there was no obvious bony abnormality that contribute to corn formation  In addition was felt that the base of the proximal phalanx of the 4th right toe would be resected  So 15 blade scalp was used to perform at 2 syndrome in her linear incision overlying the lateral aspect of the 4th MPJ  Incision deepened down through subcutaneous layers with all bleeders being bovied and coagulated as necessary  The MPJ was transected at this time on the lateral aspect  The lateral tubercle of the base of the proximal phalanx on the lateral side was localized  Oscillating saw was used to resect this  At this point was felt that adequate reduction deformity occurred closure could be obtained so after flushing with large amounts of sterile saline closure was attempted  Within both incision sites, deep structures reapproximated with 4 0 chromic in the skin with 4 nylon  Tourniquet was released and vascular status returned within normal preoperative levels  Dry sterile dressing applied  Patient returned recovery room with vital signs stable neurovascular status intact  Patient is to be followed up in the office with a week  Take the prescribed medication    She has been given written postoperative instructions as well     I was present for the entire procedure    Patient Disposition:  PACU     SIGNATURE: Umair Mathis Tomi Ashby DPM  DATE: June 25, 2021  TIME: 12:25 PM

## 2021-06-25 NOTE — H&P
H&P interval update    H&P performed by Dr Alexandre Grace MD was reviewed  After examining the patient I find no changes in the patients condition since the H&P had been written on 6-21-21      /83   Pulse 73   Temp 97 8 °F (36 6 °C) (Temporal)   Resp 16   Ht 5' (1 524 m)   Wt 83 9 kg (185 lb)   SpO2 100%   BMI 36 13 kg/m²         General:  alert, awake, oriented, no acute distress   Head: atraumatic  Heart:  regular rate and rhythm  Lungs: Clear to auscultation bilaterally  Abdomen:  soft, non distended, no tenderness to palpation  Extremities:  no erythema, no edema

## 2021-06-25 NOTE — ANESTHESIA PREPROCEDURE EVALUATION
Procedure:  HAMMERTOE SURGERY 4TH AND 5TH TOES, OSTEOTOMY 4TH TOE 4&5 right foot (Right Foot)    Relevant Problems   No relevant active problems        Physical Exam    Airway    Mallampati score: II  TM Distance: >3 FB  Neck ROM: full     Dental       Cardiovascular  Cardiovascular exam normal    Pulmonary  Pulmonary exam normal     Other Findings        Anesthesia Plan  ASA Score- 2     Anesthesia Type- IV sedation with anesthesia with ASA Monitors  Additional Monitors:   Airway Plan:           Plan Factors-Exercise tolerance (METS): >4 METS  Chart reviewed  Imaging results reviewed  Existing labs reviewed  Patient summary reviewed  Patient is not a current smoker  Induction- intravenous  Postoperative Plan- Plan for postoperative opioid use  Informed Consent- Anesthetic plan and risks discussed with patient  I personally reviewed this patient with the CRNA  Discussed and agreed on the Anesthesia Plan with the CRNA  Darwin Diaz

## 2021-06-25 NOTE — PERIOPERATIVE NURSING NOTE
Patient received back to [facility] rm 5  A&O  Dressing dry and Intact to rt foot  VSS, no c/o of pain  Call bell within reach, will continue to monitor

## 2021-06-30 ENCOUNTER — OFFICE VISIT (OUTPATIENT)
Dept: PODIATRY | Facility: CLINIC | Age: 61
End: 2021-06-30

## 2021-06-30 VITALS
RESPIRATION RATE: 17 BRPM | HEART RATE: 77 BPM | HEIGHT: 60 IN | DIASTOLIC BLOOD PRESSURE: 72 MMHG | SYSTOLIC BLOOD PRESSURE: 129 MMHG | WEIGHT: 185 LBS | BODY MASS INDEX: 36.32 KG/M2

## 2021-06-30 DIAGNOSIS — M20.41 HAMMER TOE OF RIGHT FOOT: Primary | ICD-10-CM

## 2021-06-30 PROCEDURE — 99024 POSTOP FOLLOW-UP VISIT: CPT | Performed by: PODIATRIST

## 2021-06-30 NOTE — PROGRESS NOTES
Patient is status post 5th toe surgery  She is doing well  She is using crutches  She is not taking pain medication  No history of fever or night sweats     0  No change in neurovascular status  Cap fill time within normal limits  Right foot dry sterile dressing dry  Removed  The 4th and 5th toes of right foot rectus  Incision coapted  No sign of infection  Plan  Change of dry sterile dressing  Patient will remain in surgical shoe  She will remain out of work for 2 more weeks    She will return for follow-up, x-rays stitch removal

## 2021-07-15 ENCOUNTER — OFFICE VISIT (OUTPATIENT)
Dept: PODIATRY | Facility: CLINIC | Age: 61
End: 2021-07-15

## 2021-07-15 VITALS
HEIGHT: 60 IN | BODY MASS INDEX: 36.32 KG/M2 | RESPIRATION RATE: 17 BRPM | DIASTOLIC BLOOD PRESSURE: 72 MMHG | WEIGHT: 185 LBS | SYSTOLIC BLOOD PRESSURE: 129 MMHG | HEART RATE: 77 BPM

## 2021-07-15 DIAGNOSIS — M20.41 HAMMER TOE OF RIGHT FOOT: Primary | ICD-10-CM

## 2021-07-15 RX ORDER — ETODOLAC 400 MG/1
400 TABLET, FILM COATED ORAL 2 TIMES DAILY
Qty: 60 TABLET | Refills: 0 | Status: SHIPPED | OUTPATIENT
Start: 2021-07-15 | End: 2021-08-09

## 2021-07-20 ENCOUNTER — OFFICE VISIT (OUTPATIENT)
Dept: PODIATRY | Facility: CLINIC | Age: 61
End: 2021-07-20
Payer: COMMERCIAL

## 2021-07-20 VITALS
SYSTOLIC BLOOD PRESSURE: 129 MMHG | DIASTOLIC BLOOD PRESSURE: 77 MMHG | WEIGHT: 185 LBS | RESPIRATION RATE: 17 BRPM | HEIGHT: 60 IN | BODY MASS INDEX: 36.32 KG/M2

## 2021-07-20 DIAGNOSIS — B35.9 DERMATOPHYTOSIS: Primary | ICD-10-CM

## 2021-07-20 PROCEDURE — 99212 OFFICE O/P EST SF 10 MIN: CPT | Performed by: PODIATRIST

## 2021-07-20 RX ORDER — CLOTRIMAZOLE 1 %
CREAM (GRAM) TOPICAL 2 TIMES DAILY
Qty: 30 G | Refills: 2 | Status: SHIPPED | OUTPATIENT
Start: 2021-07-20 | End: 2021-08-09

## 2021-07-20 RX ORDER — SULINDAC 200 MG/1
200 TABLET ORAL 2 TIMES DAILY
Qty: 40 TABLET | Refills: 0 | Status: SHIPPED | OUTPATIENT
Start: 2021-07-20 | End: 2021-08-17

## 2021-07-20 NOTE — PROGRESS NOTES
Patient presents as walking  She has increasing pain in the foot  Patient has burning and itching around the toes of the right foot  0  No change in neurovascular status  Patient has xerosis with scaling around the 3rd and 4th ray of the right foot  This is consistent with dermatophytosis  Slight edema and erythema noted  Plan  Gentian violet applied interdigital space    Patient be started on both oral anti-inflammatory medicine as well as topical antifungal

## 2021-08-07 DIAGNOSIS — M20.41 HAMMER TOE OF RIGHT FOOT: ICD-10-CM

## 2021-08-09 RX ORDER — ETODOLAC 400 MG/1
TABLET, FILM COATED ORAL
Qty: 60 TABLET | Refills: 0 | Status: SHIPPED | OUTPATIENT
Start: 2021-08-09

## 2021-08-16 DIAGNOSIS — B35.9 DERMATOPHYTOSIS: ICD-10-CM

## 2021-08-17 RX ORDER — SULINDAC 200 MG/1
200 TABLET ORAL 2 TIMES DAILY
Qty: 40 TABLET | Refills: 0 | Status: SHIPPED | OUTPATIENT
Start: 2021-08-17 | End: 2021-09-06

## 2021-11-11 ENCOUNTER — OFFICE VISIT (OUTPATIENT)
Dept: PODIATRY | Facility: CLINIC | Age: 61
End: 2021-11-11
Payer: COMMERCIAL

## 2021-11-11 VITALS
WEIGHT: 185 LBS | HEIGHT: 60 IN | DIASTOLIC BLOOD PRESSURE: 77 MMHG | RESPIRATION RATE: 16 BRPM | SYSTOLIC BLOOD PRESSURE: 129 MMHG | BODY MASS INDEX: 36.32 KG/M2

## 2021-11-11 DIAGNOSIS — M79.672 PAIN IN BOTH FEET: Primary | ICD-10-CM

## 2021-11-11 DIAGNOSIS — B35.1 ONYCHOMYCOSIS: ICD-10-CM

## 2021-11-11 DIAGNOSIS — B35.9 DERMATOPHYTOSIS: ICD-10-CM

## 2021-11-11 DIAGNOSIS — M79.671 PAIN IN BOTH FEET: Primary | ICD-10-CM

## 2021-11-11 PROCEDURE — 99213 OFFICE O/P EST LOW 20 MIN: CPT | Performed by: PODIATRIST

## 2021-11-11 RX ORDER — CICLOPIROX 7.7 MG/G
GEL TOPICAL
Qty: 45 G | Refills: 2 | Status: SHIPPED | OUTPATIENT
Start: 2021-11-11

## 2021-11-11 RX ORDER — TERBINAFINE HYDROCHLORIDE 250 MG/1
250 TABLET ORAL DAILY
Qty: 30 TABLET | Refills: 0 | Status: SHIPPED | OUTPATIENT
Start: 2021-11-11 | End: 2021-12-11

## 2021-12-29 ENCOUNTER — OFFICE VISIT (OUTPATIENT)
Dept: PODIATRY | Facility: CLINIC | Age: 61
End: 2021-12-29
Payer: COMMERCIAL

## 2021-12-29 VITALS
WEIGHT: 185 LBS | HEIGHT: 60 IN | BODY MASS INDEX: 36.32 KG/M2 | DIASTOLIC BLOOD PRESSURE: 77 MMHG | RESPIRATION RATE: 16 BRPM | SYSTOLIC BLOOD PRESSURE: 129 MMHG

## 2021-12-29 DIAGNOSIS — M21.961 ACQUIRED DEFORMITY OF RIGHT FOOT: Primary | ICD-10-CM

## 2021-12-29 DIAGNOSIS — M21.42 ACQUIRED FLAT FOOT, LEFT: ICD-10-CM

## 2021-12-29 DIAGNOSIS — M21.41 ACQUIRED FLAT FOOT, RIGHT: ICD-10-CM

## 2021-12-29 DIAGNOSIS — B35.1 ONYCHOMYCOSIS: ICD-10-CM

## 2021-12-29 DIAGNOSIS — M21.962 ACQUIRED DEFORMITY OF LEFT FOOT: ICD-10-CM

## 2021-12-29 DIAGNOSIS — B07.0 PLANTAR WARTS: ICD-10-CM

## 2021-12-29 PROCEDURE — 99212 OFFICE O/P EST SF 10 MIN: CPT | Performed by: PODIATRIST

## 2021-12-29 PROCEDURE — L3000 FT INSERT UCB BERKELEY SHELL: HCPCS | Performed by: PODIATRIST

## 2021-12-29 RX ORDER — TERBINAFINE HYDROCHLORIDE 250 MG/1
250 TABLET ORAL DAILY
Qty: 30 TABLET | Refills: 0 | Status: SHIPPED | OUTPATIENT
Start: 2021-12-29 | End: 2022-02-09 | Stop reason: SDUPTHER

## 2021-12-29 RX ORDER — IMIQUIMOD 12.5 MG/.25G
1 CREAM TOPICAL 3 TIMES WEEKLY
Qty: 12 EACH | Refills: 0 | Status: SHIPPED | OUTPATIENT
Start: 2021-12-29 | End: 2022-02-09 | Stop reason: SDUPTHER

## 2022-02-09 ENCOUNTER — OFFICE VISIT (OUTPATIENT)
Dept: PODIATRY | Facility: CLINIC | Age: 62
End: 2022-02-09
Payer: COMMERCIAL

## 2022-02-09 VITALS
SYSTOLIC BLOOD PRESSURE: 129 MMHG | HEIGHT: 60 IN | RESPIRATION RATE: 16 BRPM | DIASTOLIC BLOOD PRESSURE: 77 MMHG | WEIGHT: 185 LBS | BODY MASS INDEX: 36.32 KG/M2

## 2022-02-09 DIAGNOSIS — M79.671 RIGHT FOOT PAIN: Primary | ICD-10-CM

## 2022-02-09 DIAGNOSIS — B35.1 ONYCHOMYCOSIS: ICD-10-CM

## 2022-02-09 DIAGNOSIS — B07.0 PLANTAR WARTS: ICD-10-CM

## 2022-02-09 DIAGNOSIS — B35.9 DERMATOPHYTOSIS: ICD-10-CM

## 2022-02-09 PROCEDURE — 99212 OFFICE O/P EST SF 10 MIN: CPT | Performed by: PODIATRIST

## 2022-02-09 PROCEDURE — 17110 DESTRUCTION B9 LES UP TO 14: CPT | Performed by: PODIATRIST

## 2022-02-09 RX ORDER — IMIQUIMOD 12.5 MG/.25G
1 CREAM TOPICAL 3 TIMES WEEKLY
Qty: 12 EACH | Refills: 0 | Status: SHIPPED | OUTPATIENT
Start: 2022-02-09

## 2022-02-09 RX ORDER — TERBINAFINE HYDROCHLORIDE 250 MG/1
250 TABLET ORAL DAILY
Qty: 30 TABLET | Refills: 0 | Status: SHIPPED | OUTPATIENT
Start: 2022-02-09 | End: 2022-03-10 | Stop reason: SDUPTHER

## 2022-02-09 NOTE — PROGRESS NOTES
Assessment/Plan:  Metatarsalgia secondary to acquired deformity of foot  Acquired pes planus  Pronation syndrome  Atypical plantar verruca bilateral foot  Resolving mycosis of nail bilateral     Plan  Patient examined  Patient educated on condition  All nails debrided without pain or complication  Plantar skin lesions debrided  We will refill terbinafine  We will add Aldara cream   Cantharone applied to right foot lesion         Diagnoses and all orders for this visit:     Acquired deformity of right foot     Acquired deformity of left foot     Acquired flat foot, right     Acquired flat foot, left     Plantar warts  -     imiquimod (ALDARA) 5 % cream; Apply 1 packet topically 3 (three) times a week     Onychomycosis  -     terbinafine (LamISIL) 250 mg tablet; Take 1 tablet (250 mg total) by mouth daily            Subjective:  Patient has pain in her feet  She took medication as prescribed  However she still gets pain in the ball of her feet she also concerned with her toenails    No Known Allergies        Current Outpatient Medications:     acetaminophen (TYLENOL) 325 mg tablet, Take 650 mg by mouth every 6 (six) hours as needed for mild pain, Disp: , Rfl:     cholecalciferol (VITAMIN D3) 1,000 units tablet, Take 1,000 Units by mouth daily, Disp: , Rfl:     ciclopirox (LOPROX) 0 77 % cream, Apply topically 2 (two) times a day for 20 days, Disp: 45 g, Rfl: 2    Ciclopirox 0 77 % gel, Apply to feet b i d  for 4 weeks, Disp: 45 g, Rfl: 2    clotrimazole (LOTRIMIN) 1 % cream, Apply topically 2 (two) times a day for 20 days, Disp: 30 g, Rfl: 2    etodolac (LODINE) 400 MG tablet, TAKE 1 TABLET BY MOUTH TWICE A DAY, Disp: 60 tablet, Rfl: 0    Flaxseed, Linseed, (Flaxseed Oil) 1000 MG CAPS, Take by mouth, Disp: , Rfl:     hydrOXYzine HCL (ATARAX) 25 mg tablet, Take 1 tablet (25 mg total) by mouth 3 (three) times a day for 3 days, Disp: 9 tablet, Rfl: 0    ibuprofen (MOTRIN) 200 mg tablet, Take by mouth every 6 (six) hours as needed for mild pain, Disp: , Rfl:     imiquimod (ALDARA) 5 % cream, Apply 1 packet topically 3 (three) times a week, Disp: 12 each, Rfl: 0    meloxicam (MOBIC) 7 5 mg tablet, Take 1 tablet (7 5 mg total) by mouth daily for 10 days, Disp: 10 tablet, Rfl: 0    Multiple Vitamin (multivitamin) tablet, Take 1 tablet by mouth daily, Disp: , Rfl:     Omega-3 Fatty Acids (fish oil) 1,000 mg, Take 1,000 mg by mouth daily, Disp: , Rfl:     sulindac (CLINORIL) 200 MG tablet, TAKE 1 TABLET (200 MG TOTAL) BY MOUTH 2 (TWO) TIMES A DAY FOR 20 DAYS, Disp: 40 tablet, Rfl: 0    terbinafine (LamISIL) 250 mg tablet, Take 1 tablet (250 mg total) by mouth daily, Disp: 30 tablet, Rfl: 0         Patient Active Problem List   Diagnosis    Acquired hammer toe of right foot    Exostosis             Patient ID: Bartolo Marin is a 63 y o  female      HPI     The following portions of the patient's history were reviewed and updated as appropriate:      family history is not on file        reports that she has never smoked  She has never used smokeless tobacco  She reports that she does not drink alcohol and does not use drugs           Objective:  Patient's shoes and socks removed  Foot ExamPhysical Exam       Vitals and nursing note reviewed  Constitutional:       Appearance: Normal appearance  Cardiovascular:      Rate and Rhythm: Normal rate and regular rhythm  Skin:     Capillary Refill: Capillary refill takes less than 2 seconds       Comments:  Profound xerosis of skin noted   This is secondary to a moccasin tinea pedis in dermatophytosis   All nails are mycotic and demonstrate dystrophy   Hallux has mycosis and are ingrown  Submetatarsal 5 of the right foot as well as the heel of the left foot demonstrates multiple atypical skin lesions there are hyperkeratotic  These are consistent with atypical verruca  Neurological:      Mental Status: She is alert     Psychiatric: Destiny Armstrong and Affect: Mood normal          Behavior: Behavior normal          Thought Content: Thought content normal          Judgment: Judgment normal       Patient is pronated in stance and gait  She has collapse of the medial arch    She demonstrates functional hallux limitus

## 2022-03-10 ENCOUNTER — OFFICE VISIT (OUTPATIENT)
Dept: PODIATRY | Facility: CLINIC | Age: 62
End: 2022-03-10
Payer: COMMERCIAL

## 2022-03-10 VITALS
RESPIRATION RATE: 16 BRPM | DIASTOLIC BLOOD PRESSURE: 77 MMHG | HEIGHT: 60 IN | SYSTOLIC BLOOD PRESSURE: 129 MMHG | WEIGHT: 185 LBS | BODY MASS INDEX: 36.32 KG/M2

## 2022-03-10 DIAGNOSIS — B07.0 PLANTAR WARTS: Primary | ICD-10-CM

## 2022-03-10 DIAGNOSIS — B35.9 DERMATOPHYTOSIS: ICD-10-CM

## 2022-03-10 DIAGNOSIS — B35.1 ONYCHOMYCOSIS: ICD-10-CM

## 2022-03-10 DIAGNOSIS — M54.16 RADICULOPATHY OF LUMBAR REGION: ICD-10-CM

## 2022-03-10 DIAGNOSIS — M79.671 RIGHT FOOT PAIN: ICD-10-CM

## 2022-03-10 PROCEDURE — 99213 OFFICE O/P EST LOW 20 MIN: CPT | Performed by: PODIATRIST

## 2022-03-10 PROCEDURE — 17110 DESTRUCTION B9 LES UP TO 14: CPT | Performed by: PODIATRIST

## 2022-03-10 RX ORDER — TERBINAFINE HYDROCHLORIDE 250 MG/1
250 TABLET ORAL DAILY
Qty: 30 TABLET | Refills: 0 | Status: SHIPPED | OUTPATIENT
Start: 2022-03-10 | End: 2022-03-11

## 2022-03-10 RX ORDER — GABAPENTIN 100 MG/1
100 CAPSULE ORAL
Qty: 30 CAPSULE | Refills: 1 | Status: SHIPPED | OUTPATIENT
Start: 2022-03-10 | End: 2022-05-09

## 2022-03-10 NOTE — PROGRESS NOTES
Assessment/Plan:  Metatarsalgia secondary to radiculopathy  Dermatophytosis, resolving  Mycosis of nail, resolving  Plantar verruca submetatarsal 5 right foot    Plan  Foot exam performed  All abnormal skin debrided  Cantharone applied right foot lesion  Patient advised on aftercare  Patient remain on Lamisil  We will add gabapentin  Return for follow-up         Diagnoses and all orders for this visit:    Plantar warts    Right foot pain    Onychomycosis  -     terbinafine (LamISIL) 250 mg tablet; Take 1 tablet (250 mg total) by mouth daily    Dermatophytosis    Radiculopathy of lumbar region  -     gabapentin (NEURONTIN) 100 mg capsule; Take 1 capsule (100 mg total) by mouth daily at bedtime          Subjective:  Patient is doing as directed  She is taking medication  She still has pain in the ball of the right foot  She gets pain in her back and feet at night  She wakes up with back and foot pain    No history of trauma    No Known Allergies      Current Outpatient Medications:     acetaminophen (TYLENOL) 325 mg tablet, Take 650 mg by mouth every 6 (six) hours as needed for mild pain, Disp: , Rfl:     cholecalciferol (VITAMIN D3) 1,000 units tablet, Take 1,000 Units by mouth daily, Disp: , Rfl:     ciclopirox (LOPROX) 0 77 % cream, Apply topically 2 (two) times a day for 20 days, Disp: 45 g, Rfl: 2    Ciclopirox 0 77 % gel, Apply to feet b i d  for 4 weeks, Disp: 45 g, Rfl: 2    clotrimazole (LOTRIMIN) 1 % cream, Apply topically 2 (two) times a day for 20 days, Disp: 30 g, Rfl: 2    etodolac (LODINE) 400 MG tablet, TAKE 1 TABLET BY MOUTH TWICE A DAY, Disp: 60 tablet, Rfl: 0    Flaxseed, Linseed, (Flaxseed Oil) 1000 MG CAPS, Take by mouth, Disp: , Rfl:     gabapentin (NEURONTIN) 100 mg capsule, Take 1 capsule (100 mg total) by mouth daily at bedtime, Disp: 30 capsule, Rfl: 1    hydrOXYzine HCL (ATARAX) 25 mg tablet, Take 1 tablet (25 mg total) by mouth 3 (three) times a day for 3 days, Disp: 9 X-ray found evidence of loose bodies and effusion  Follow with ortho as outpatient.    tablet, Rfl: 0    ibuprofen (MOTRIN) 200 mg tablet, Take by mouth every 6 (six) hours as needed for mild pain, Disp: , Rfl:     imiquimod (ALDARA) 5 % cream, Apply 1 packet topically 3 (three) times a week, Disp: 12 each, Rfl: 0    meloxicam (MOBIC) 7 5 mg tablet, Take 1 tablet (7 5 mg total) by mouth daily for 10 days, Disp: 10 tablet, Rfl: 0    Multiple Vitamin (multivitamin) tablet, Take 1 tablet by mouth daily, Disp: , Rfl:     Omega-3 Fatty Acids (fish oil) 1,000 mg, Take 1,000 mg by mouth daily, Disp: , Rfl:     sulindac (CLINORIL) 200 MG tablet, TAKE 1 TABLET (200 MG TOTAL) BY MOUTH 2 (TWO) TIMES A DAY FOR 20 DAYS, Disp: 40 tablet, Rfl: 0    terbinafine (LamISIL) 250 mg tablet, Take 1 tablet (250 mg total) by mouth daily, Disp: 30 tablet, Rfl: 0    Patient Active Problem List   Diagnosis    Acquired hammer toe of right foot    Exostosis          Patient ID: Wilber Coombs is a 63 y o  female  HPI    The following portions of the patient's history were reviewed and updated as appropriate:     family history is not on file  reports that she has never smoked  She has never used smokeless tobacco  She reports that she does not drink alcohol and does not use drugs  Vitals:    03/10/22 1036   BP: 129/77   Resp: 16       Review of Systems      Objective:  Patient's shoes and socks removed  Foot ExamPhysical Exam         Vitals and nursing note reviewed  Constitutional:       Appearance: Normal appearance  Cardiovascular:      Rate and Rhythm: Normal rate and regular rhythm     Skin:     Capillary Refill: Capillary refill takes less than 2 seconds       Comments:  Profound xerosis of skin noted   This is secondary to a moccasin tinea pedis in dermatophytosis   All nails are mycotic and demonstrate dystrophy   Hallux has mycosis and are ingrown   Submetatarsal 5 of the right foot as well as the heel of the left foot demonstrates multiple atypical skin lesions there are hyperkeratotic  Delberta Grates are consistent with atypical verruca  Neurological:      Mental Status: She is alert  L5 testing demonstrates 4/5 testing bilateral  Psychiatric:         Mood and Affect: Mood normal          Behavior: Behavior normal          Thought Content:  Thought content normal          Judgment: Judgment normal       Patient is pronated in stance and gait   She has collapse of the medial arch   She demonstrates functional hallux limitus

## 2022-03-11 DIAGNOSIS — B35.1 ONYCHOMYCOSIS: ICD-10-CM

## 2022-03-11 RX ORDER — TERBINAFINE HYDROCHLORIDE 250 MG/1
TABLET ORAL
Qty: 30 TABLET | Refills: 0 | Status: SHIPPED | OUTPATIENT
Start: 2022-03-11 | End: 2022-03-14 | Stop reason: SDUPTHER

## 2022-03-14 DIAGNOSIS — B35.1 ONYCHOMYCOSIS: ICD-10-CM

## 2022-03-14 RX ORDER — TERBINAFINE HYDROCHLORIDE 250 MG/1
250 TABLET ORAL DAILY
Qty: 30 TABLET | Refills: 0 | Status: SHIPPED | OUTPATIENT
Start: 2022-03-14 | End: 2022-04-03

## 2023-08-03 ENCOUNTER — HOSPITAL ENCOUNTER (OUTPATIENT)
Dept: RADIOLOGY | Facility: HOSPITAL | Age: 63
Discharge: HOME | End: 2023-08-03
Payer: COMMERCIAL

## 2023-08-03 DIAGNOSIS — M54.12 BRACHIAL NEURITIS: ICD-10-CM

## 2023-08-03 PROCEDURE — 72110 X-RAY EXAM L-2 SPINE 4/>VWS: CPT

## 2024-06-09 ENCOUNTER — HOSPITAL ENCOUNTER (EMERGENCY)
Facility: HOSPITAL | Age: 64
Discharge: HOME | End: 2024-06-09
Attending: EMERGENCY MEDICINE
Payer: COMMERCIAL

## 2024-06-09 ENCOUNTER — APPOINTMENT (EMERGENCY)
Dept: RADIOLOGY | Facility: HOSPITAL | Age: 64
End: 2024-06-09
Payer: COMMERCIAL

## 2024-06-09 VITALS
OXYGEN SATURATION: 99 % | SYSTOLIC BLOOD PRESSURE: 111 MMHG | DIASTOLIC BLOOD PRESSURE: 64 MMHG | HEART RATE: 70 BPM | RESPIRATION RATE: 18 BRPM | TEMPERATURE: 97.3 F

## 2024-06-09 DIAGNOSIS — K86.2 PANCREATIC CYST: ICD-10-CM

## 2024-06-09 DIAGNOSIS — D32.9 MENINGIOMA (HCC): ICD-10-CM

## 2024-06-09 DIAGNOSIS — S00.81XA FACIAL ABRASION, INITIAL ENCOUNTER: ICD-10-CM

## 2024-06-09 DIAGNOSIS — S09.90XA CLOSED HEAD INJURY, INITIAL ENCOUNTER: Primary | ICD-10-CM

## 2024-06-09 LAB
ALBUMIN SERPL BCP-MCNC: 4.6 G/DL (ref 3.5–5)
ALP SERPL-CCNC: 50 U/L (ref 34–104)
ALT SERPL W P-5'-P-CCNC: 19 U/L (ref 7–52)
ANION GAP SERPL CALCULATED.3IONS-SCNC: 8 MMOL/L (ref 4–13)
APTT PPP: 27 SECONDS (ref 23–37)
AST SERPL W P-5'-P-CCNC: 16 U/L (ref 13–39)
BASOPHILS # BLD AUTO: 0.01 THOUSANDS/ÂΜL (ref 0–0.1)
BASOPHILS NFR BLD AUTO: 0 % (ref 0–1)
BILIRUB SERPL-MCNC: 0.53 MG/DL (ref 0.2–1)
BUN SERPL-MCNC: 9 MG/DL (ref 5–25)
CALCIUM SERPL-MCNC: 9.6 MG/DL (ref 8.4–10.2)
CHLORIDE SERPL-SCNC: 105 MMOL/L (ref 96–108)
CO2 SERPL-SCNC: 25 MMOL/L (ref 21–32)
CREAT SERPL-MCNC: 0.68 MG/DL (ref 0.6–1.3)
EOSINOPHIL # BLD AUTO: 0.02 THOUSAND/ÂΜL (ref 0–0.61)
EOSINOPHIL NFR BLD AUTO: 1 % (ref 0–6)
ERYTHROCYTE [DISTWIDTH] IN BLOOD BY AUTOMATED COUNT: 14.6 % (ref 11.6–15.1)
GFR SERPL CREATININE-BSD FRML MDRD: 94 ML/MIN/1.73SQ M
GLUCOSE SERPL-MCNC: 142 MG/DL (ref 65–140)
HCT VFR BLD AUTO: 38.8 % (ref 34.8–46.1)
HGB BLD-MCNC: 12.5 G/DL (ref 11.5–15.4)
IMM GRANULOCYTES # BLD AUTO: 0 THOUSAND/UL (ref 0–0.2)
IMM GRANULOCYTES NFR BLD AUTO: 0 % (ref 0–2)
INR PPP: 0.96 (ref 0.84–1.19)
LYMPHOCYTES # BLD AUTO: 2.39 THOUSANDS/ÂΜL (ref 0.6–4.47)
LYMPHOCYTES NFR BLD AUTO: 54 % (ref 14–44)
MCH RBC QN AUTO: 26.5 PG (ref 26.8–34.3)
MCHC RBC AUTO-ENTMCNC: 32.2 G/DL (ref 31.4–37.4)
MCV RBC AUTO: 82 FL (ref 82–98)
MONOCYTES # BLD AUTO: 0.27 THOUSAND/ÂΜL (ref 0.17–1.22)
MONOCYTES NFR BLD AUTO: 6 % (ref 4–12)
NEUTROPHILS # BLD AUTO: 1.71 THOUSANDS/ÂΜL (ref 1.85–7.62)
NEUTS SEG NFR BLD AUTO: 39 % (ref 43–75)
NRBC BLD AUTO-RTO: 0 /100 WBCS
PLATELET # BLD AUTO: 337 THOUSANDS/UL (ref 149–390)
PMV BLD AUTO: 9.3 FL (ref 8.9–12.7)
POTASSIUM SERPL-SCNC: 3.5 MMOL/L (ref 3.5–5.3)
PROT SERPL-MCNC: 7.5 G/DL (ref 6.4–8.4)
PROTHROMBIN TIME: 13 SECONDS (ref 11.6–14.5)
RBC # BLD AUTO: 4.71 MILLION/UL (ref 3.81–5.12)
SODIUM SERPL-SCNC: 138 MMOL/L (ref 135–147)
WBC # BLD AUTO: 4.4 THOUSAND/UL (ref 4.31–10.16)

## 2024-06-09 PROCEDURE — 74177 CT ABD & PELVIS W/CONTRAST: CPT

## 2024-06-09 PROCEDURE — 90471 IMMUNIZATION ADMIN: CPT

## 2024-06-09 PROCEDURE — 85610 PROTHROMBIN TIME: CPT | Performed by: EMERGENCY MEDICINE

## 2024-06-09 PROCEDURE — 70486 CT MAXILLOFACIAL W/O DYE: CPT

## 2024-06-09 PROCEDURE — 71260 CT THORAX DX C+: CPT

## 2024-06-09 PROCEDURE — 85025 COMPLETE CBC W/AUTO DIFF WBC: CPT | Performed by: EMERGENCY MEDICINE

## 2024-06-09 PROCEDURE — 85730 THROMBOPLASTIN TIME PARTIAL: CPT | Performed by: EMERGENCY MEDICINE

## 2024-06-09 PROCEDURE — 90715 TDAP VACCINE 7 YRS/> IM: CPT | Performed by: EMERGENCY MEDICINE

## 2024-06-09 PROCEDURE — 96374 THER/PROPH/DIAG INJ IV PUSH: CPT

## 2024-06-09 PROCEDURE — 99284 EMERGENCY DEPT VISIT MOD MDM: CPT

## 2024-06-09 PROCEDURE — 99285 EMERGENCY DEPT VISIT HI MDM: CPT | Performed by: EMERGENCY MEDICINE

## 2024-06-09 PROCEDURE — 96375 TX/PRO/DX INJ NEW DRUG ADDON: CPT

## 2024-06-09 PROCEDURE — 80053 COMPREHEN METABOLIC PANEL: CPT | Performed by: EMERGENCY MEDICINE

## 2024-06-09 PROCEDURE — 36415 COLL VENOUS BLD VENIPUNCTURE: CPT | Performed by: EMERGENCY MEDICINE

## 2024-06-09 PROCEDURE — 73130 X-RAY EXAM OF HAND: CPT

## 2024-06-09 PROCEDURE — 70450 CT HEAD/BRAIN W/O DYE: CPT

## 2024-06-09 PROCEDURE — 73110 X-RAY EXAM OF WRIST: CPT

## 2024-06-09 PROCEDURE — 72125 CT NECK SPINE W/O DYE: CPT

## 2024-06-09 RX ORDER — KETOROLAC TROMETHAMINE 30 MG/ML
15 INJECTION, SOLUTION INTRAMUSCULAR; INTRAVENOUS ONCE
Status: COMPLETED | OUTPATIENT
Start: 2024-06-09 | End: 2024-06-09

## 2024-06-09 RX ORDER — LIDOCAINE HYDROCHLORIDE 20 MG/ML
1 JELLY TOPICAL ONCE
Status: COMPLETED | OUTPATIENT
Start: 2024-06-09 | End: 2024-06-09

## 2024-06-09 RX ORDER — ACETAMINOPHEN 10 MG/ML
1000 INJECTION, SOLUTION INTRAVENOUS ONCE
Status: COMPLETED | OUTPATIENT
Start: 2024-06-09 | End: 2024-06-09

## 2024-06-09 RX ORDER — GINSENG 100 MG
1 CAPSULE ORAL ONCE
Status: COMPLETED | OUTPATIENT
Start: 2024-06-09 | End: 2024-06-09

## 2024-06-09 RX ADMIN — TETANUS TOXOID, REDUCED DIPHTHERIA TOXOID AND ACELLULAR PERTUSSIS VACCINE, ADSORBED 0.5 ML: 5; 2.5; 8; 8; 2.5 SUSPENSION INTRAMUSCULAR at 10:04

## 2024-06-09 RX ADMIN — KETOROLAC TROMETHAMINE 15 MG: 30 INJECTION, SOLUTION INTRAMUSCULAR at 12:26

## 2024-06-09 RX ADMIN — IOHEXOL 100 ML: 350 INJECTION, SOLUTION INTRAVENOUS at 10:54

## 2024-06-09 RX ADMIN — LIDOCAINE HYDROCHLORIDE 1 APPLICATION: 20 JELLY TOPICAL at 11:54

## 2024-06-09 RX ADMIN — ACETAMINOPHEN 1000 MG: 10 INJECTION INTRAVENOUS at 10:06

## 2024-06-09 RX ADMIN — BACITRACIN ZINC 1 LARGE APPLICATION: 500 OINTMENT TOPICAL at 10:03

## 2024-06-09 NOTE — ED PROVIDER NOTES
History  Chief Complaint   Patient presents with    Head Injury     Pt reports of walking the dogs and pt lost balance and fell on concrete sidewalk. Pt denies thinners. Pt reports of dizziness. Pt has abrasion noted on face     Patient is a 64-year-old female with no significant prior medical history, that presents to the emergency department after a mechanical fall.  Patient states that she was walking her dog, when she tripped and fell face forward onto the curb.  She denies loss of consciousness, but states that she feels dizzy.  Patient with multiple abrasions to her face.  She is unsure of the date of her last tetanus booster.      History provided by:  Patient  History limited by:  Acuity of condition   used: No        Prior to Admission Medications   Prescriptions Last Dose Informant Patient Reported? Taking?   Ciclopirox 0.77 % gel   No No   Sig: Apply to feet b.i.d. for 4 weeks   Flaxseed, Linseed, (Flaxseed Oil) 1000 MG CAPS   Yes No   Sig: Take by mouth   Multiple Vitamin (multivitamin) tablet   Yes No   Sig: Take 1 tablet by mouth daily   Omega-3 Fatty Acids (fish oil) 1,000 mg   Yes No   Sig: Take 1,000 mg by mouth daily   acetaminophen (TYLENOL) 325 mg tablet   Yes No   Sig: Take 650 mg by mouth every 6 (six) hours as needed for mild pain   cholecalciferol (VITAMIN D3) 1,000 units tablet   Yes No   Sig: Take 1,000 Units by mouth daily   ciclopirox (LOPROX) 0.77 % cream   No No   Sig: Apply topically 2 (two) times a day for 20 days   clotrimazole (LOTRIMIN) 1 % cream   No No   Sig: Apply topically 2 (two) times a day for 20 days   etodolac (LODINE) 400 MG tablet   No No   Sig: TAKE 1 TABLET BY MOUTH TWICE A DAY   gabapentin (NEURONTIN) 100 mg capsule   No No   Sig: Take 1 capsule (100 mg total) by mouth daily at bedtime   hydrOXYzine HCL (ATARAX) 25 mg tablet   No No   Sig: Take 1 tablet (25 mg total) by mouth 3 (three) times a day for 3 days   ibuprofen (MOTRIN) 200 mg tablet   Yes  No   Sig: Take by mouth every 6 (six) hours as needed for mild pain   imiquimod (ALDARA) 5 % cream   No No   Sig: Apply 1 packet topically 3 (three) times a week   meloxicam (MOBIC) 7.5 mg tablet   No No   Sig: Take 1 tablet (7.5 mg total) by mouth daily for 10 days   sulindac (CLINORIL) 200 MG tablet   No No   Sig: TAKE 1 TABLET (200 MG TOTAL) BY MOUTH 2 (TWO) TIMES A DAY FOR 20 DAYS      Facility-Administered Medications: None       Past Medical History:   Diagnosis Date    Colon polyp     Obesity        Past Surgical History:   Procedure Laterality Date    COLONOSCOPY      HYSTERECTOMY      UT REPAIR OF HAMMERTOE,ONE Right 6/25/2021    Procedure: HAMMERTOE SURGERY 5TH TOE, OSTEOTOMY 4TH TOE 4&5 right foot;  Surgeon: Kali Chery DPM;  Location: [facility];  Service: Podiatry       No family history on file.  I have reviewed and agree with the history as documented.    E-Cigarette/Vaping     E-Cigarette/Vaping Substances     Social History     Tobacco Use    Smoking status: Never    Smokeless tobacco: Never   Substance Use Topics    Alcohol use: Never    Drug use: Never       Review of Systems   Constitutional:  Negative for chills and fever.   Respiratory:  Negative for cough, chest tightness and shortness of breath.    Gastrointestinal:  Negative for abdominal pain, diarrhea, nausea and vomiting.   Genitourinary:  Negative for dysuria, frequency, hematuria and urgency.   Musculoskeletal:  Negative for back pain, neck pain and neck stiffness.   Skin:  Positive for color change and wound.   Psychiatric/Behavioral:  The patient is nervous/anxious.    All other systems reviewed and are negative.      Physical Exam  Physical Exam  Vitals and nursing note reviewed.   Constitutional:       General: She is not in acute distress.     Appearance: Normal appearance. She is well-developed. She is not diaphoretic.   HENT:      Head: Normocephalic. Abrasion present.      Jaw: Tenderness present.     Eyes:      Extraocular  Movements: Extraocular movements intact.      Conjunctiva/sclera: Conjunctivae normal.      Pupils: Pupils are equal, round, and reactive to light.   Cardiovascular:      Rate and Rhythm: Normal rate and regular rhythm.      Heart sounds: Normal heart sounds. No murmur heard.  Pulmonary:      Effort: Pulmonary effort is normal. No respiratory distress.      Breath sounds: Normal breath sounds.   Abdominal:      General: Bowel sounds are normal. There is no distension.      Palpations: Abdomen is soft.      Tenderness: There is no abdominal tenderness.   Musculoskeletal:         General: No deformity.      Right shoulder: Normal.      Left shoulder: Normal.      Right upper arm: Normal.      Left upper arm: Normal.      Right elbow: Normal.      Left elbow: Normal.      Right forearm: Normal.      Left forearm: Normal.      Right wrist: No deformity, effusion or lacerations. Decreased range of motion.      Right hand: Normal.      Left hand: Tenderness and bony tenderness present. No swelling, deformity or lacerations.      Cervical back: Normal range of motion and neck supple.   Skin:     General: Skin is warm and dry.      Capillary Refill: Capillary refill takes less than 2 seconds.      Coloration: Skin is not pale.      Findings: No rash.   Neurological:      General: No focal deficit present.      Mental Status: She is alert and oriented to person, place, and time.      Cranial Nerves: No cranial nerve deficit.   Psychiatric:         Behavior: Behavior normal.         Vital Signs  ED Triage Vitals   Temperature Pulse Respirations Blood Pressure SpO2   06/09/24 0930 06/09/24 0928 06/09/24 0928 06/09/24 0928 06/09/24 0928   (!) 97.3 °F (36.3 °C) 87 20 (!) 178/88 99 %      Temp Source Heart Rate Source Patient Position - Orthostatic VS BP Location FiO2 (%)   06/09/24 0930 06/09/24 0928 06/09/24 0928 06/09/24 0928 --   Temporal Monitor Sitting Left arm       Pain Score       06/09/24 1226       7           Vitals:     06/09/24 1145 06/09/24 1215 06/09/24 1315 06/09/24 1345   BP: 134/70 146/92 120/66 111/64   Pulse: 66 74 68 70   Patient Position - Orthostatic VS: Lying Lying Lying          Visual Acuity  Visual Acuity      Flowsheet Row Most Recent Value   L Pupil Size (mm) 4   R Pupil Size (mm) 4            ED Medications  Medications   tetanus-diphtheria-acellular pertussis (BOOSTRIX) IM injection 0.5 mL (0.5 mL Intramuscular Given 6/9/24 1004)   bacitracin topical ointment 1 large application (1 large application Topical Given 6/9/24 1003)   acetaminophen (Ofirmev) injection 1,000 mg (0 mg Intravenous Stopped 6/9/24 1021)   iohexol (OMNIPAQUE) 350 MG/ML injection (MULTI-DOSE) 100 mL (100 mL Intravenous Given 6/9/24 1054)   lidocaine (URO-JET) 2 % urethral/mucosal gel 1 Application (1 Application Urethral Given 6/9/24 1154)   ketorolac (TORADOL) injection 15 mg (15 mg Intravenous Given 6/9/24 1226)       Diagnostic Studies  Results Reviewed       Procedure Component Value Units Date/Time    Comprehensive metabolic panel [[id number]]  (Abnormal) Collected: 06/09/24 0955    Lab Status: Final result Specimen: Blood from Arm, Right Updated: 06/09/24 1024     Sodium 138 mmol/L      Potassium 3.5 mmol/L      Chloride 105 mmol/L      CO2 25 mmol/L      ANION GAP 8 mmol/L      BUN 9 mg/dL      Creatinine 0.68 mg/dL      Glucose 142 mg/dL      Calcium 9.6 mg/dL      AST 16 U/L      ALT 19 U/L      Alkaline Phosphatase 50 U/L      Total Protein 7.5 g/dL      Albumin 4.6 g/dL      Total Bilirubin 0.53 mg/dL      eGFR 94 ml/min/1.73sq m     Narrative:      National Kidney Disease Foundation guidelines for Chronic Kidney Disease (CKD):     Stage 1 with normal or high GFR (GFR > 90 mL/min/1.73 square meters)    Stage 2 Mild CKD (GFR = 60-89 mL/min/1.73 square meters)    Stage 3A Moderate CKD (GFR = 45-59 mL/min/1.73 square meters)    Stage 3B Moderate CKD (GFR = 30-44 mL/min/1.73 square meters)    Stage 4 Severe CKD (GFR = 15-29  mL/min/1.73 square meters)    Stage 5 End Stage CKD (GFR <15 mL/min/1.73 square meters)  Note: GFR calculation is accurate only with a steady state creatinine    Protime-INR [[id number]]  (Normal) Collected: 06/09/24 0955    Lab Status: Final result Specimen: Blood from Arm, Right Updated: 06/09/24 1021     Protime 13.0 seconds      INR 0.96    APTT [[id number]]  (Normal) Collected: 06/09/24 0955    Lab Status: Final result Specimen: Blood from Arm, Right Updated: 06/09/24 1021     PTT 27 seconds     CBC and differential [[id number]]  (Abnormal) Collected: 06/09/24 0955    Lab Status: Final result Specimen: Blood from Arm, Right Updated: 06/09/24 1001     WBC 4.40 Thousand/uL      RBC 4.71 Million/uL      Hemoglobin 12.5 g/dL      Hematocrit 38.8 %      MCV 82 fL      MCH 26.5 pg      MCHC 32.2 g/dL      RDW 14.6 %      MPV 9.3 fL      Platelets 337 Thousands/uL      nRBC 0 /100 WBCs      Segmented % 39 %      Immature Grans % 0 %      Lymphocytes % 54 %      Monocytes % 6 %      Eosinophils Relative 1 %      Basophils Relative 0 %      Absolute Neutrophils 1.71 Thousands/µL      Absolute Immature Grans 0.00 Thousand/uL      Absolute Lymphocytes 2.39 Thousands/µL      Absolute Monocytes 0.27 Thousand/µL      Eosinophils Absolute 0.02 Thousand/µL      Basophils Absolute 0.01 Thousands/µL                    CT chest abdomen pelvis w contrast   Final Result by Broderick Moore MD (06/09 6886)      No posttraumatic skeletal or visceral abnormalities or other acute findings.      4 mm cyst or lipomatous lesion at the pancreatic body. Features are nonaggressive. Recommend MRI/MRCP surveillance in 12 months.      Note: The study was marked in EPIC for immediate notification. Imaging follow-up reminder notification was scheduled in the electronic medical record.      Workstation performed: [id number]         CT head without contrast   Final Result by Broderick Moore MD (06/09 1996)      No acute intracranial abnormality.       Left frontal meningioma shows no significant interval change in slightly greater than 3 years.      The study was marked in EPIC for immediate notification.                  Workstation performed: [id number]         CT cervical spine without contrast   Final Result by Broderick Moore MD (06/09 1123)      No cervical spine fracture or traumatic malalignment.      The study was marked in EPIC for immediate notification.            Workstation performed: [id number]         CT facial bones without contrast   Final Result by Manolo Cruz MD (06/09 1136)         1. No evidence of acute traumatic injury to the facial bones.   2. Left malar soft tissue swelling/contusion. No radiopaque foreign body   3. Left frontal osteoma arising from the inner table left frontal bone versus densely calcified meningioma stable from 2021, incompletely imaged. See separate head CT.               Workstation performed: [id number]         XR wrist 3+ views RIGHT   ED Interpretation by Lynnette Montes DO (06/09 1333)   nad      XR hand 3+ views LEFT   ED Interpretation by Lynnette Montes DO (06/09 1334)   nad                 Procedures  Procedures         ED Course                                             Medical Decision Making  64-year-old female presents after mechanical fall.  Given the significance of patient injuries, labs, CT head/C-spine/facial bones/chest/abdomen/pelvis ordered and reviewed.  No acute traumatic pathology noted.  I informed patient of meningioma and pancreatic cysts that were incidental findings.  Patient with significant facial abrasions, which were cleaned in the ED and dressed.  Tetanus booster was updated.  Patient will be discharged home with outpatient follow-up with the burn center/plastics/primary care physician.  She will continue OTC meds as needed for pain relief.  Return precautions reviewed with patient and her son who is at the bedside at time of discharge.    Amount and/or  Complexity of Data Reviewed  Labs: ordered.  Radiology: ordered and independent interpretation performed.    Risk  OTC drugs.  Prescription drug management.             Disposition  Final diagnoses:   Closed head injury, initial encounter   Facial abrasion, initial encounter   Meningioma (HCC) - unchanged   Pancreatic cyst vs lipomatous lesion     Time reflects when diagnosis was documented in both MDM as applicable and the Disposition within this note       Time User Action Codes Description Comment    6/9/2024 12:26 PM Oyesanmi, Olubusola O Add [S09.90XA] Closed head injury, initial encounter     6/9/2024 12:26 PM Oyesanmi, Olubusola O Add [S00.81XA] Facial abrasion, initial encounter     6/9/2024 12:27 PM Oyesanmi, Olubusola O Add [D32.9] Meningioma (HCC)     6/9/2024 12:27 PM Oyesanmi, Olubusola O Modify [D32.9] Meningioma (HCC) - unchanged     6/9/2024 12:29 PM Oyesanmi, Olubusola O Add [K86.2] Pancreatic cyst     6/9/2024 12:29 PM Oyesanmi, Olubusola O Modify [K86.2] Pancreatic cyst vs lipomatous lesion           ED Disposition       ED Disposition   Discharge    Condition   Stable    Date/Time   Sun Jun 9, 2024 12:26 PM    Comment   Vidhi Meyer discharge to home/self care.                   Follow-up Information       Follow up With Specialties Details Why Contact Info Additional Information    [facility] Schedule an appointment as soon as possible for a visit in 1 day for follow up for pancreatic cyst versus lipomatous lesion [address] [place] [id number]  [phone] [facility], [address] [place] [address], [phone]       [phone]    [facility]  Schedule an appointment as soon as possible for a visit in 1 day for follow up for facial abrasions [address] [facility] [address] [facility] [address]  [phone]      Blaze Mata MD Plastic Surgery Schedule an appointment as soon as possible for a visit in 1 week for follow up for facial abrasions [address] [facility].  [address]  [facility] [address]  [phone]               Discharge Medication List as of 6/9/2024  2:25 PM        CONTINUE these medications which have NOT CHANGED    Details   acetaminophen (TYLENOL) 325 mg tablet Take 650 mg by mouth every 6 (six) hours as needed for mild pain, Historical Med      cholecalciferol (VITAMIN D3) 1,000 units tablet Take 1,000 Units by mouth daily, Historical Med      ciclopirox (LOPROX) 0.77 % cream Apply topically 2 (two) times a day for 20 days, Starting Thu 4/15/2021, Until Wed 5/5/2021, Normal      Ciclopirox 0.77 % gel Apply to feet b.i.d. for 4 weeks, Normal      clotrimazole (LOTRIMIN) 1 % cream Apply topically 2 (two) times a day for 20 days, Starting Tue 7/20/2021, Until Mon 8/9/2021, Normal      etodolac (LODINE) 400 MG tablet TAKE 1 TABLET BY MOUTH TWICE A DAY, Normal      Flaxseed, Linseed, (Flaxseed Oil) 1000 MG CAPS Take by mouth, Historical Med      gabapentin (NEURONTIN) 100 mg capsule Take 1 capsule (100 mg total) by mouth daily at bedtime, Starting Thu 3/10/2022, Until Mon 5/9/2022, Normal      hydrOXYzine HCL (ATARAX) 25 mg tablet Take 1 tablet (25 mg total) by mouth 3 (three) times a day for 3 days, Starting Fri 6/25/2021, Until Mon 6/28/2021, Normal      ibuprofen (MOTRIN) 200 mg tablet Take by mouth every 6 (six) hours as needed for mild pain, Historical Med      imiquimod (ALDARA) 5 % cream Apply 1 packet topically 3 (three) times a week, Starting Wed 2/9/2022, Normal      meloxicam (MOBIC) 7.5 mg tablet Take 1 tablet (7.5 mg total) by mouth daily for 10 days, Starting Fri 6/25/2021, Until Mon 7/5/2021, Normal      Multiple Vitamin (multivitamin) tablet Take 1 tablet by mouth daily, Historical Med      Omega-3 Fatty Acids (fish oil) 1,000 mg Take 1,000 mg by mouth daily, Historical Med      sulindac  (CLINORIL) 200 MG tablet TAKE 1 TABLET (200 MG TOTAL) BY MOUTH 2 (TWO) TIMES A DAY FOR 20 DAYS, Starting Tue 8/17/2021, Until Mon 9/6/2021, Normal             No discharge procedures on file.    PDMP Review       None            ED Provider  Electronically Signed by             Lynnette Montes DO  06/09/24 0128

## 2024-06-09 NOTE — Clinical Note
Vidhi Meyer was seen and treated in our emergency department on 6/9/2024.                Diagnosis:     Vidhi  may return to work on return date.    She may return on this date: 06/17/2024         If you have any questions or concerns, please don't hesitate to call.      Lynnette Montes, DO    ______________________________           _______________          _______________  Hospital Representative                              Date                                Time

## 2024-06-09 NOTE — DISCHARGE INSTRUCTIONS
Apply bacitracin to your face twice daily for the next 2 to 3 days, after which you may switch to petroleum jelly  The CT of your head shows a stable meningioma, that has not changed significantly in the past 3 years.  The CT of your abdomen and pelvis shows a pancreatic cyst/lipomatous lesion.  There is a recommendation for an MRI/MRCP in 12 months to further evaluate it.  Speak to her primary care physician about ordering the study or follow-up with gastroenterology.

## 2024-07-11 ENCOUNTER — CONSULT (OUTPATIENT)
Dept: GASTROENTEROLOGY | Facility: CLINIC | Age: 64
End: 2024-07-11
Payer: COMMERCIAL

## 2024-07-11 VITALS
WEIGHT: 189 LBS | HEIGHT: 60 IN | DIASTOLIC BLOOD PRESSURE: 84 MMHG | TEMPERATURE: 98.8 F | SYSTOLIC BLOOD PRESSURE: 114 MMHG | BODY MASS INDEX: 37.11 KG/M2

## 2024-07-11 DIAGNOSIS — K86.9 PANCREATIC LESION: Primary | ICD-10-CM

## 2024-07-11 DIAGNOSIS — Q45.3 PANCREATIC ABNORMALITY: ICD-10-CM

## 2024-07-11 PROCEDURE — 99203 OFFICE O/P NEW LOW 30 MIN: CPT | Performed by: PHYSICIAN ASSISTANT

## 2024-07-11 RX ORDER — MULTIVIT-MIN/IRON/FOLIC ACID/K 18-600-40
500 CAPSULE ORAL DAILY
COMMUNITY

## 2024-07-11 RX ORDER — CHOLECALCIFEROL (VITAMIN D3) 125 MCG
1000 TABLET ORAL DAILY
COMMUNITY

## 2024-07-11 NOTE — PROGRESS NOTES
[facility] - Outpatient Consultation New Patient  Vidhi Meyer 64 y.o. female MRN: [id number]  Encounter: [id number]    ASSESSMENT AND PLAN:    1. Pancreatic lesion  2. Pancreatic abnormality  Patient with pancreatic abnormality on CT scan.  She is otherwise feeling well from a GI standpoint without acute complaints or alarming symptoms today.  She is up-to-date on colorectal cancer screening.  Will obtain MRI abdomen with without contrast and MRCP for further evaluation/characterization of pancreatic lesion    - MRI abdomen w wo contrast and mrcp; Future    Patient was instructed to call the office with any questions, concerns, new/ worsening/ persisting GI symptoms. Advised patient go to the ER with any severe or worsening abdominal pain, fevers/ chills, intractable N/V, chest pain, SOB, dizziness, lightheadedness, feeling something stuck in esophagus that will not go down. Patient expressed understanding and is in agreement with treatment plan.       Will plan to follow up after diagnostic tests   ________________________________________________________    HPI:      Patient is new to me and our office.  Patient presents to the office today to discuss abnormal imaging of the pancreas.  Patient was seen in the emergency room 6/9/2024 after a fall.  ER note from that day reviewed.  Blood work in ER notable for elevated glucose at 142, otherwise normal liver enzymes.  CBC without anemia.  Patient underwent CT chest abdomen pelvis with contrast which showed a incidental 4 mm cyst or lipomatous lesion at the pancreatic body, features appearing nonaggressive.  MRI MRCP recommended.      Patient reports feeling well overall.  She continues to recover from her fall last month, undergoing physical therapy and Occupational Therapy.  She has no specific GI complaints or concerns today.   She is eating and drinking well.   BM's are regular and stool is brown and formed.   Patient denies any  fevers/ chills, unintentional weight loss, decreased appetite, abdominal pain, nausea vomiting, change in bowel habits, diarrhea, constipation, black or bloody stools, heartburn, dysphagia, odynophagia.   Denies chest pain, SOB    She denies family history of colon cancer or pancreatic cancer   She had a colonoscopy ~ 15 years ago and per patient this was normal. She did have negative/ normal cologuard in 2022       REVIEW OF SYSTEMS:    Review of Systems   Constitutional:  Negative for chills and fever.   HENT:  Negative for ear pain and sore throat.    Eyes:  Negative for pain and visual disturbance.   Respiratory:  Negative for cough and shortness of breath.    Cardiovascular:  Negative for chest pain and palpitations.   Gastrointestinal:  Negative for abdominal pain and vomiting.   Genitourinary:  Negative for dysuria and hematuria.   Musculoskeletal:  Negative for arthralgias and back pain.   Skin:  Negative for color change and rash.   Neurological:  Negative for seizures and syncope.   All other systems reviewed and are negative.       Historical Information   Past Medical History:   Diagnosis Date    Colon polyp     Obesity      Past Surgical History:   Procedure Laterality Date    COLONOSCOPY      HYSTERECTOMY      [place] REPAIR OF HAMMERTOE,ONE Right 6/25/2021    Procedure: HAMMERTOE SURGERY 5TH TOE, OSTEOTOMY 4TH TOE 4&5 right foot;  Surgeon: Kali Chery DPM;  Location: [facility];  Service: Podiatry     Social History   Social History     Substance and Sexual Activity   Alcohol Use Never     Social History     Substance and Sexual Activity   Drug Use Never     Social History     Tobacco Use   Smoking Status Never   Smokeless Tobacco Never     No family history on file.    Meds/Allergies       Current Outpatient Medications:     acetaminophen (TYLENOL) 325 mg tablet    cholecalciferol (VITAMIN D3) 1,000 units tablet    ciclopirox (LOPROX) 0.77 % cream    Ciclopirox 0.77 % gel    clotrimazole (LOTRIMIN) 1 %  cream    etodolac (LODINE) 400 MG tablet    Flaxseed, Linseed, (Flaxseed Oil) 1000 MG CAPS    gabapentin (NEURONTIN) 100 mg capsule    hydrOXYzine HCL (ATARAX) 25 mg tablet    ibuprofen (MOTRIN) 200 mg tablet    imiquimod (ALDARA) 5 % cream    meloxicam (MOBIC) 7.5 mg tablet    Multiple Vitamin (multivitamin) tablet    Omega-3 Fatty Acids (fish oil) 1,000 mg    sulindac (CLINORIL) 200 MG tablet    No Known Allergies        Objective   Wt Readings from Last 3 Encounters:   03/10/22 83.9 kg (185 lb)   02/09/22 83.9 kg (185 lb)   12/29/21 83.9 kg (185 lb)     Temp Readings from Last 3 Encounters:   06/09/24 (!) 97.3 °F (36.3 °C) (Temporal)   06/25/21 (!) 97 °F (36.1 °C)   03/25/21 (!) 97.2 °F (36.2 °C) (Tympanic)     BP Readings from Last 3 Encounters:   06/09/24 111/64   03/10/22 129/77   02/09/22 129/77     Pulse Readings from Last 3 Encounters:   06/09/24 70   07/15/21 77   06/30/21 77        PHYSICAL EXAM:     Physical Exam  Vitals reviewed.   Constitutional:       General: She is not in acute distress.     Appearance: She is not toxic-appearing.   HENT:      Head: Normocephalic and atraumatic.   Eyes:      Extraocular Movements: Extraocular movements intact.      Conjunctiva/sclera: Conjunctivae normal.   Cardiovascular:      Rate and Rhythm: Normal rate and regular rhythm.   Pulmonary:      Effort: Pulmonary effort is normal. No respiratory distress.      Breath sounds: Normal breath sounds.   Abdominal:      General: Bowel sounds are normal.      Palpations: Abdomen is soft.      Tenderness: There is no abdominal tenderness.   Musculoskeletal:         General: No swelling or tenderness.      Cervical back: Normal range of motion and neck supple.   Skin:     General: Skin is warm and dry.      Coloration: Skin is not jaundiced.   Neurological:      General: No focal deficit present.      Mental Status: She is alert and oriented to person, place, and time. Mental status is at baseline.   Psychiatric:         Mood  and Affect: Mood normal.         Behavior: Behavior normal.         Thought Content: Thought content normal.             Lab Results:     Lab Results   Component Value Date    WBC 4.40 06/09/2024    HGB 12.5 06/09/2024    HCT 38.8 06/09/2024    MCV 82 06/09/2024     06/09/2024       Lab Results   Component Value Date    SODIUM 138 06/09/2024    K 3.5 06/09/2024     06/09/2024    CO2 25 06/09/2024    AGAP 8 06/09/2024    BUN 9 06/09/2024    CREATININE 0.68 06/09/2024    GLUC 142 (H) 06/09/2024    GLUF 104 (H) 02/10/2020    CALCIUM 9.6 06/09/2024    AST 16 06/09/2024    ALT 19 06/09/2024    ALKPHOS 50 06/09/2024    TP 7.5 06/09/2024    TBILI 0.53 06/09/2024    EGFR 94 06/09/2024       Kacey Caal PA-C   Available on [facility]

## 2024-08-20 ENCOUNTER — HOSPITAL ENCOUNTER (OUTPATIENT)
Dept: RADIOLOGY | Facility: HOSPITAL | Age: 64
Discharge: HOME | End: 2024-08-20
Payer: COMMERCIAL

## 2024-08-20 DIAGNOSIS — K86.9 PANCREATIC LESION: ICD-10-CM

## 2024-08-20 DIAGNOSIS — Q45.3 PANCREATIC ABNORMALITY: ICD-10-CM

## 2024-08-20 PROCEDURE — 74183 MRI ABD W/O CNTR FLWD CNTR: CPT

## 2024-08-20 PROCEDURE — A9585 GADOBUTROL INJECTION: HCPCS | Performed by: PHYSICIAN ASSISTANT

## 2024-08-20 RX ORDER — GADOBUTROL 604.72 MG/ML
9 INJECTION INTRAVENOUS
Status: COMPLETED | OUTPATIENT
Start: 2024-08-20 | End: 2024-08-20

## 2024-08-20 RX ADMIN — GADOBUTROL 9 ML: 604.72 INJECTION INTRAVENOUS at 13:21

## 2024-08-20 NOTE — LETTER
[facility]  [address] [address]      August 28, 2024    MRN: [id number]     Phone: [phone]     Dear Ms. Meyer,    You recently had a(n) MRI performed on 8/20/2024 at  [facility] that was requested by Kacey Caal PA-C. The study was reviewed by a radiologist, which is a physician who specializes in medical imaging. The radiologist issued a report describing his or her findings. In that report there was a finding that the radiologist felt warranted further discussion with your health care provider and that discussion would be beneficial to you.      The results were sent to Kacey Caal PA-C on 08/23/2024  6:07 AM. We recommend that you contact Kacey Caal PA-C at [phone] or set up an appointment to discuss the results of the imaging test. If you have already heard from Kacey Caal PA-C regarding the results of your study, you can disregard this letter.     This letter is not meant to alarm you, but intended to encourage you to follow-up on your results with the provider that sent you for the imaging study. In addition, we have enclosed answers to frequently asked questions by other patients who have also received a letter to review results with their health care provider (see page two).      Thank you for choosing [facility] for your medical imaging needs.                                                                                                                                                        FREQUENTLY ASKED QUESTIONS    Why am I receiving this letter?  [place] State Law requires us to notify patients who have findings on imaging exams that may require more testing or follow-up with a health professional within the next 3 months.        How serious is the finding on the imaging test?  This letter is sent to all patients who may need follow-up or more testing within the  next 3 months.  Receiving this letter does not necessarily mean you have a life-threatening imaging finding or disease.  Recommendations in the radiologist’s imaging report are general in nature and it is up to your healthcare provider to say whether those recommendations make sense for your situation.  You are strongly encouraged to talk to your health care provider about the results and ask whether additional steps need to be taken.    Where can I get a copy of the final report for my recent radiology exam?  To get a full copy of the report you can access your records online at [email or web address] or please contact [facility]’s Medical Records Department at [phone] Monday through Friday between 8 am and 6 pm.         What do I need to do now?           Please contact your health care provider who requested the imaging study to discuss what further actions (if any) are needed.  You may have already heard from (your ordering provider) in regard to this test in which case you can disregard this letter.        NOTICE IN ACCORDANCE WITH THE [place] STATE “PATIENT TEST RESULT INFORMATION ACT OF 2018”    You are receiving this notice as a result of a determination by your diagnostic imaging service that further discussions of your test results are warranted and would be beneficial to you.    The complete results of your test or tests have been or will be sent to the health care practitioner that ordered the test or tests. It is recommended that you contact your health care practitioner to discuss your results as soon as possible.

## 2024-08-23 ENCOUNTER — TELEPHONE (OUTPATIENT)
Age: 64
End: 2024-08-23

## 2024-08-23 DIAGNOSIS — Z01.812 BLOOD TESTS PRIOR TO TREATMENT OR PROCEDURE: ICD-10-CM

## 2024-08-23 DIAGNOSIS — Q45.3 PANCREATIC ABNORMALITY: ICD-10-CM

## 2024-08-23 DIAGNOSIS — K86.9 PANCREATIC LESION: Primary | ICD-10-CM

## 2024-08-23 NOTE — TELEPHONE ENCOUNTER
Patients GI provider: [facility]     Number to return call: [phone]    Reason for call:  Radiology calling in with significant findings MRI ABDOMEN     Scheduled procedure/appointment date if applicable: Apt/procedure 8/20

## 2024-08-23 NOTE — TELEPHONE ENCOUNTER
I spoke with patient and reviewed provider note:   Can you please call patient and let her know that her MRI results did confirm that she has 2 cysts in her pancreas.  The size is small and is not concerning but we need to continue to monitor this through MRI.  We recommend a repeat MRI in 1 year.  Please let her know that Kacey Caal is off this week and I am covering for her.  Thanks.    Patient advised to contact us if needed, currently not scheduled for follow up and she is agreeable to repeat MRI in one year.  I have placed order for MRI and BUN/Cr to complete prior to MRI in 2025.     Since patient does not have MyChart please mail orders to home address.

## 2024-08-27 DIAGNOSIS — Z11.59 NEED FOR HEPATITIS B SCREENING TEST: ICD-10-CM

## 2024-08-27 DIAGNOSIS — K76.0 HEPATIC STEATOSIS: Primary | ICD-10-CM

## 2024-08-27 DIAGNOSIS — Z11.59 NEED FOR HEPATITIS C SCREENING TEST: ICD-10-CM

## 2024-08-27 NOTE — RESULT ENCOUNTER NOTE
Please call the patient and let her know that I reviewed her MRI results, they came in while I was out of the office.  I agree with recommendation for repeat MRI in 1 year to keep an eye on her very small pancreatic cysts.  Please let the patient know that otherwise the MRI did show a moderately enlarged liver with moderate hepatic steatosis or fatty liver.  Fatty liver is very common. Hepatic steatosis or fatty liver over time can lead to inflammation in the liver and liver damage.  I recommended and patient begin following a more low carb, high-fiber healthy diet, avoidance of processed foods, alcohol cessation, and exercise as tolerated to promote slow sustained weight loss as treatment for fatty liver.  I also think that the patient would benefit from an ultrasound elastography test which is a special type of ultrasound that looks a little bit more closely at the liver.  This would be helpful to know exactly how much fat is in the liver and if there is any liver damage or scarring present.  I will place this order in the system.  She is also due to have routine hepatitis testing as this is recommended for all adults and I do not see these results in the system.  Please instruct her to complete these labs sooner, rather than later, they are not fasting.  Please have the patient call central scheduling at [phone] to schedule the ultrasound elastography.      Any questions or concerns please let me know.   Thanks!  Kacey Caal PA-C

## 2024-08-27 NOTE — TELEPHONE ENCOUNTER
Follow-up per [facility] nurse   Pt called said missed a call from us today - regarding lab work she is supposed to get... ( no calls documented today)  If we need to reach her , she can be reached @ [phone]

## 2024-09-13 ENCOUNTER — HOSPITAL ENCOUNTER (OUTPATIENT)
Dept: RADIOLOGY | Facility: HOSPITAL | Age: 64
Discharge: HOME | End: 2024-09-13
Payer: COMMERCIAL

## 2024-09-13 DIAGNOSIS — K76.0 HEPATIC STEATOSIS: ICD-10-CM

## 2024-09-13 PROCEDURE — 76981 USE PARENCHYMA: CPT

## 2024-09-17 ENCOUNTER — TELEPHONE (OUTPATIENT)
Dept: GASTROENTEROLOGY | Facility: CLINIC | Age: 64
End: 2024-09-17

## 2024-09-17 NOTE — TELEPHONE ENCOUNTER
Called pt and left message to call the office for results to Elastography     Please let her know that thankfully she does not have any significant fibrosis or scarring of the liver.  This is good news.  The ultrasound does however show a significant amount of steatosis or fat in the liver.   Please tell the patient that hepatic steatosis or fatty liver over time can lead to inflammation in the liver and liver damage.  For fatty liver we recommend following a more low carb, high-fiber healthy diet, avoidance of processed foods and artificial sugars, alcohol cessation, and exercise as tolerated to promote slow sustained weight loss as treatment for fatty liver.   I recommend she complete her routine hepatitis testing which was ordered at the end of August.  Please instruct the patient to go to the lab to have this blood work completed when able

## 2024-09-20 ENCOUNTER — APPOINTMENT (OUTPATIENT)
Dept: LAB | Facility: CLINIC | Age: 64
End: 2024-09-20
Payer: COMMERCIAL

## 2024-09-20 DIAGNOSIS — Z11.59 NEED FOR HEPATITIS B SCREENING TEST: Primary | ICD-10-CM

## 2024-09-20 DIAGNOSIS — Z11.59 NEED FOR HEPATITIS C SCREENING TEST: ICD-10-CM

## 2024-09-20 LAB
HBV CORE AB SER QL: REACTIVE
HBV SURFACE AG SER QL: NORMAL
HCV AB SER QL: NORMAL

## 2024-09-20 PROCEDURE — 86704 HEP B CORE ANTIBODY TOTAL: CPT

## 2024-09-20 PROCEDURE — 86803 HEPATITIS C AB TEST: CPT

## 2024-09-20 PROCEDURE — 36415 COLL VENOUS BLD VENIPUNCTURE: CPT

## 2024-09-20 PROCEDURE — 87340 HEPATITIS B SURFACE AG IA: CPT

## 2024-09-20 NOTE — TELEPHONE ENCOUNTER
Called and spoke with patient.Informed her of results and recommendations.She verbalized understanding.She states she will have her blood work done today.

## 2024-09-20 NOTE — TELEPHONE ENCOUNTER
Calling for  results but disconnected before I could get a nurse on for her, please give a call when possible to go over this with her.

## 2024-09-23 DIAGNOSIS — Z11.59 NEED FOR HEPATITIS B SCREENING TEST: Primary | ICD-10-CM

## 2024-09-23 NOTE — RESULT ENCOUNTER NOTE
Please call the patient and let her know that I reviewed her blood work results.  Good news, she tested negative for hepatitis C.  Her hepatitis B testing came back consistent with a possible PRIOR infection of hepatitis B, NOT active infection.  To make sure that this is the case however, she does require 1 additional hepatitis B blood test.  I placed this order into the system.  Please call the patient and instruct her to complete this additional hepatitis B blood test when she can.  This is not fasting.  Again, this is to make sure, she has immunity to hepatitis B from possible prior infection.    Any questions or concerns please let me know.   Thanks!  Kacey Caal PA-C

## 2024-10-07 ENCOUNTER — TRANSCRIBE ORDERS (OUTPATIENT)
Dept: LAB | Facility: CLINIC | Age: 64
End: 2024-10-07

## 2024-10-07 DIAGNOSIS — Z11.59 NEED FOR HEPATITIS B SCREENING TEST: ICD-10-CM

## 2025-03-04 LAB
BUN SERPL-MCNC: 14 MG/DL (ref 8–27)
CREAT SERPL-MCNC: 0.71 MG/DL (ref 0.57–1)
EGFR: 95 ML/MIN/1.73
HBV SURFACE AB SER-ACNC: 624 MIU/ML

## 2025-07-08 ENCOUNTER — TELEPHONE (OUTPATIENT)
Dept: GASTROENTEROLOGY | Facility: CLINIC | Age: 65
End: 2025-07-08

## 2025-07-08 DIAGNOSIS — K76.0 HEPATIC STEATOSIS: Primary | ICD-10-CM

## 2025-07-08 NOTE — TELEPHONE ENCOUNTER
Pt returning Zak's call. Pt aware that must do blood work before her 8/7/25 MRI. Pt also aware must fast 8 hrs for one of her blood work test. Pt scheduled for 12/15 since Kacey had no available appts at all in the fall. Pt is placed on wait list starting in the fall months.

## (undated) DEVICE — ACE WRAP 3 IN VELCRO LATEX FREE

## (undated) DEVICE — ACE WRAP 3 IN UNSTERILE

## (undated) DEVICE — SUT ETHILON 4-0 PS-2 18 IN 1667G

## (undated) DEVICE — CHLORAPREP HI-LITE 26ML ORANGE

## (undated) DEVICE — STOCKINETTE,IMPERVIOUS,12X48,STERILE: Brand: MEDLINE

## (undated) DEVICE — CURITY NON-ADHERENT STRIPS: Brand: CURITY

## (undated) DEVICE — HALF SHEET: Brand: CONVERTORS

## (undated) DEVICE — TIBURON EXTREMITY SHEET: Brand: CONVERTORS

## (undated) DEVICE — SUT CHROMIC 4-0 SH-1 27 IN G181H

## (undated) DEVICE — STRETCH BANDAGE: Brand: CURITY

## (undated) DEVICE — GLOVE INDICATOR PI UNDERGLOVE SZ 8 BLUE

## (undated) DEVICE — PRECISION THIN (9.0 X 0.38 X 25.0MM)

## (undated) DEVICE — PACK GENERAL LF

## (undated) DEVICE — CUFF TOURNIQUET 18 X 4 IN QUICK CONNECT DISP 1 BLADDER

## (undated) DEVICE — INTENDED FOR TISSUE SEPARATION, AND OTHER PROCEDURES THAT REQUIRE A SHARP SURGICAL BLADE TO PUNCTURE OR CUT.: Brand: BARD-PARKER ® CARBON RIB-BACK BLADES

## (undated) DEVICE — GLOVE SRG BIOGEL 7.5

## (undated) DEVICE — SPONGE GAUZE 4 X 8 12 PLY STRL LF

## (undated) DEVICE — BASIC DOUBLE BASIN 2-LF: Brand: MEDLINE INDUSTRIES, INC.

## (undated) DEVICE — ASTOUND STANDARD SURGICAL GOWN, XL: Brand: CONVERTORS